# Patient Record
Sex: MALE | Race: WHITE | NOT HISPANIC OR LATINO | Employment: OTHER | URBAN - METROPOLITAN AREA
[De-identification: names, ages, dates, MRNs, and addresses within clinical notes are randomized per-mention and may not be internally consistent; named-entity substitution may affect disease eponyms.]

---

## 2018-07-23 ENCOUNTER — HOSPITAL ENCOUNTER (EMERGENCY)
Facility: HOSPITAL | Age: 58
Discharge: HOME | End: 2018-07-23
Attending: EMERGENCY MEDICINE | Admitting: EMERGENCY MEDICINE

## 2018-07-23 VITALS
DIASTOLIC BLOOD PRESSURE: 78 MMHG | RESPIRATION RATE: 20 BRPM | WEIGHT: 197 LBS | HEIGHT: 70 IN | SYSTOLIC BLOOD PRESSURE: 125 MMHG | TEMPERATURE: 98.5 F | OXYGEN SATURATION: 97 % | HEART RATE: 92 BPM | BODY MASS INDEX: 28.2 KG/M2

## 2018-07-23 DIAGNOSIS — R07.89 ATYPICAL CHEST PAIN: ICD-10-CM

## 2018-07-23 DIAGNOSIS — T78.40XA ALLERGIC REACTION TO DRUG, INITIAL ENCOUNTER: Primary | ICD-10-CM

## 2018-07-23 LAB
HOLD SPECIMEN: NORMAL
HOLD SPECIMEN: NORMAL
TROPONIN T SERPL-MCNC: <0.01 NG/ML (ref 0–0.03)
WHOLE BLOOD HOLD SPECIMEN: NORMAL
WHOLE BLOOD HOLD SPECIMEN: NORMAL

## 2018-07-23 PROCEDURE — 84484 ASSAY OF TROPONIN QUANT: CPT | Performed by: PHYSICIAN ASSISTANT

## 2018-07-23 PROCEDURE — 25010000002 ONDANSETRON PER 1 MG: Performed by: PHYSICIAN ASSISTANT

## 2018-07-23 PROCEDURE — 96375 TX/PRO/DX INJ NEW DRUG ADDON: CPT

## 2018-07-23 PROCEDURE — 93005 ELECTROCARDIOGRAM TRACING: CPT | Performed by: EMERGENCY MEDICINE

## 2018-07-23 PROCEDURE — 99284 EMERGENCY DEPT VISIT MOD MDM: CPT

## 2018-07-23 PROCEDURE — 96374 THER/PROPH/DIAG INJ IV PUSH: CPT

## 2018-07-23 PROCEDURE — 25010000002 METHYLPREDNISOLONE PER 125 MG: Performed by: PHYSICIAN ASSISTANT

## 2018-07-23 PROCEDURE — 93010 ELECTROCARDIOGRAM REPORT: CPT | Performed by: INTERNAL MEDICINE

## 2018-07-23 PROCEDURE — 99284 EMERGENCY DEPT VISIT MOD MDM: CPT | Performed by: PHYSICIAN ASSISTANT

## 2018-07-23 PROCEDURE — 25010000002 DIPHENHYDRAMINE PER 50 MG: Performed by: PHYSICIAN ASSISTANT

## 2018-07-23 RX ORDER — FAMOTIDINE 20 MG/1
20 TABLET, FILM COATED ORAL NIGHTLY PRN
Qty: 30 TABLET | Refills: 0 | Status: SHIPPED | OUTPATIENT
Start: 2018-07-23 | End: 2020-07-21

## 2018-07-23 RX ORDER — METHYLPREDNISOLONE SODIUM SUCCINATE 125 MG/2ML
125 INJECTION, POWDER, LYOPHILIZED, FOR SOLUTION INTRAMUSCULAR; INTRAVENOUS ONCE
Status: COMPLETED | OUTPATIENT
Start: 2018-07-23 | End: 2018-07-23

## 2018-07-23 RX ORDER — DIPHENHYDRAMINE HYDROCHLORIDE 50 MG/ML
25 INJECTION INTRAMUSCULAR; INTRAVENOUS ONCE
Status: COMPLETED | OUTPATIENT
Start: 2018-07-23 | End: 2018-07-23

## 2018-07-23 RX ORDER — EPINEPHRINE 0.3 MG/.3ML
0.3 INJECTION SUBCUTANEOUS ONCE
Qty: 1 EACH | Refills: 0 | Status: SHIPPED | OUTPATIENT
Start: 2018-07-23 | End: 2018-07-23

## 2018-07-23 RX ORDER — ONDANSETRON 4 MG/1
4 TABLET, ORALLY DISINTEGRATING ORAL 4 TIMES DAILY PRN
Qty: 12 TABLET | Refills: 0 | Status: SHIPPED | OUTPATIENT
Start: 2018-07-23 | End: 2020-07-21

## 2018-07-23 RX ORDER — SODIUM CHLORIDE 0.9 % (FLUSH) 0.9 %
10 SYRINGE (ML) INJECTION AS NEEDED
Status: DISCONTINUED | OUTPATIENT
Start: 2018-07-23 | End: 2018-07-23 | Stop reason: HOSPADM

## 2018-07-23 RX ORDER — METHYLPREDNISOLONE 4 MG/1
TABLET ORAL
Qty: 21 TABLET | Refills: 0 | Status: SHIPPED | OUTPATIENT
Start: 2018-07-23 | End: 2020-07-21

## 2018-07-23 RX ORDER — ONDANSETRON 2 MG/ML
4 INJECTION INTRAMUSCULAR; INTRAVENOUS ONCE
Status: COMPLETED | OUTPATIENT
Start: 2018-07-23 | End: 2018-07-23

## 2018-07-23 RX ADMIN — DIPHENHYDRAMINE HYDROCHLORIDE 25 MG: 50 INJECTION, SOLUTION INTRAMUSCULAR; INTRAVENOUS at 14:26

## 2018-07-23 RX ADMIN — METHYLPREDNISOLONE SODIUM SUCCINATE 125 MG: 125 INJECTION, POWDER, FOR SOLUTION INTRAMUSCULAR; INTRAVENOUS at 14:25

## 2018-07-23 RX ADMIN — FAMOTIDINE 20 MG: 10 INJECTION, SOLUTION INTRAVENOUS at 14:25

## 2018-07-23 RX ADMIN — ONDANSETRON 4 MG: 2 SOLUTION INTRAMUSCULAR; INTRAVENOUS at 15:02

## 2020-07-21 ENCOUNTER — OFFICE VISIT (OUTPATIENT)
Dept: FAMILY MEDICINE CLINIC | Facility: CLINIC | Age: 60
End: 2020-07-21

## 2020-07-21 VITALS
HEIGHT: 70 IN | HEART RATE: 69 BPM | RESPIRATION RATE: 14 BRPM | BODY MASS INDEX: 30.78 KG/M2 | OXYGEN SATURATION: 98 % | WEIGHT: 215 LBS | DIASTOLIC BLOOD PRESSURE: 80 MMHG | TEMPERATURE: 97.5 F | SYSTOLIC BLOOD PRESSURE: 122 MMHG

## 2020-07-21 DIAGNOSIS — Z00.00 ENCOUNTER FOR WELL ADULT EXAM WITHOUT ABNORMAL FINDINGS: ICD-10-CM

## 2020-07-21 DIAGNOSIS — M79.7 FIBROMYALGIA: ICD-10-CM

## 2020-07-21 DIAGNOSIS — R23.3 EASY BRUISING: ICD-10-CM

## 2020-07-21 DIAGNOSIS — E55.9 VITAMIN D DEFICIENCY: ICD-10-CM

## 2020-07-21 DIAGNOSIS — R25.2 MUSCLE CRAMPING: ICD-10-CM

## 2020-07-21 DIAGNOSIS — Z12.5 SCREENING PSA (PROSTATE SPECIFIC ANTIGEN): ICD-10-CM

## 2020-07-21 DIAGNOSIS — Z79.899 HIGH RISK MEDICATION USE: ICD-10-CM

## 2020-07-21 DIAGNOSIS — Z12.11 SCREEN FOR COLON CANCER: Primary | ICD-10-CM

## 2020-07-21 DIAGNOSIS — F43.10 PTSD (POST-TRAUMATIC STRESS DISORDER): ICD-10-CM

## 2020-07-21 DIAGNOSIS — Z11.59 ENCOUNTER FOR HEPATITIS C SCREENING TEST FOR LOW RISK PATIENT: ICD-10-CM

## 2020-07-21 PROCEDURE — 99204 OFFICE O/P NEW MOD 45 MIN: CPT | Performed by: FAMILY MEDICINE

## 2020-07-21 NOTE — PATIENT INSTRUCTIONS
Call [facility] today to schedule an appointment ASAP.    [facility]  [address], [place] [address]    The phone number is [phone]   Walk-in hours are as follows:    Monday: 8:30 am - 2:30 pm  Tuesday:8:30 am - 2:30 pm  Wednesday:8:30 am - 2:30 pm  Thursday:8:30 am - 2:30 pm      Exercising to Lose Weight  Exercise is structured, repetitive physical activity to improve fitness and health. Getting regular exercise is important for everyone. It is especially important if you are overweight. Being overweight increases your risk of heart disease, stroke, diabetes, high blood pressure, and several types of cancer. Reducing your calorie intake and exercising can help you lose weight.  Exercise is usually categorized as moderate or vigorous intensity. To lose weight, most people need to do a certain amount of moderate-intensity or vigorous-intensity exercise each week.  Moderate-intensity exercise    Moderate-intensity exercise is any activity that gets you moving enough to burn at least three times more energy (calories) than if you were sitting.  Examples of moderate exercise include:  · Walking a mile in 15 minutes.  · Doing light yard work.  · Biking at an easy pace.  Most people should get at least 150 minutes (2 hours and 30 minutes) a week of moderate-intensity exercise to maintain their body weight.  Vigorous-intensity exercise  Vigorous-intensity exercise is any activity that gets you moving enough to burn at least six times more calories than if you were sitting. When you exercise at this intensity, you should be working hard enough that you are not able to carry on a conversation.  Examples of vigorous exercise include:  · Running.  · Playing a team sport, such as football, basketball, and soccer.  · Jumping rope.  Most people should get at least 75 minutes (1 hour and 15 minutes) a week of vigorous-intensity exercise to maintain their body weight.  How can exercise affect me?  When you  exercise enough to burn more calories than you eat, you lose weight. Exercise also reduces body fat and builds muscle. The more muscle you have, the more calories you burn. Exercise also:  · Improves mood.  · Reduces stress and tension.  · Improves your overall fitness, flexibility, and endurance.  · Increases bone strength.  The amount of exercise you need to lose weight depends on:  · Your age.  · The type of exercise.  · Any health conditions you have.  · Your overall physical ability.  Talk to your health care provider about how much exercise you need and what types of activities are safe for you.  What actions can I take to lose weight?  Nutrition    · Make changes to your diet as told by your health care provider or diet and nutrition specialist (dietitian). This may include:  ? Eating fewer calories.  ? Eating more protein.  ? Eating less unhealthy fats.  ? Eating a diet that includes fresh fruits and vegetables, whole grains, low-fat dairy products, and lean protein.  ? Avoiding foods with added fat, salt, and sugar.  · Drink plenty of water while you exercise to prevent dehydration or heat stroke.  Activity  · Choose an activity that you enjoy and set realistic goals. Your health care provider can help you make an exercise plan that works for you.  · Exercise at a moderate or vigorous intensity most days of the week.  ? The intensity of exercise may vary from person to person. You can tell how intense a workout is for you by paying attention to your breathing and heartbeat. Most people will notice their breathing and heartbeat get faster with more intense exercise.  · Do resistance training twice each week, such as:  ? Push-ups.  ? Sit-ups.  ? Lifting weights.  ? Using resistance bands.  · Getting short amounts of exercise can be just as helpful as long structured periods of exercise. If you have trouble finding time to exercise, try to include exercise in your daily routine.  ? Get up, stretch, and walk  around every 30 minutes throughout the day.  ? Go for a walk during your lunch break.  ? Park your car farther away from your destination.  ? If you take public transportation, get off one stop early and walk the rest of the way.  ? Make phone calls while standing up and walking around.  ? Take the stairs instead of elevators or escalators.  · Wear comfortable clothes and shoes with good support.  · Do not exercise so much that you hurt yourself, feel dizzy, or get very short of breath.  Where to find more information  · U.S. Department of Health and Human Services: www.hhs.gov  · Centers for Disease Control and Prevention (CDC): www.cdc.gov  Contact a health care provider:  · Before starting a new exercise program.  · If you have questions or concerns about your weight.  · If you have a medical problem that keeps you from exercising.  Get help right away if you have any of the following while exercising:  · Injury.  · Dizziness.  · Difficulty breathing or shortness of breath that does not go away when you stop exercising.  · Chest pain.  · Rapid heartbeat.  Summary  · Being overweight increases your risk of heart disease, stroke, diabetes, high blood pressure, and several types of cancer.  · Losing weight happens when you burn more calories than you eat.  · Reducing the amount of calories you eat in addition to getting regular moderate or vigorous exercise each week helps you lose weight.  This information is not intended to replace advice given to you by your health care provider. Make sure you discuss any questions you have with your health care provider.  Document Released: 01/20/2012 Document Revised: 12/31/2018 Document Reviewed: 12/31/2018  Elsevier Patient Education © 2020 [employer]

## 2020-07-21 NOTE — ASSESSMENT & PLAN NOTE
Awaiting appointment with  neurology.   No pertinent past medical history <<----- Click to add NO pertinent Past Medical History
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
## 2020-07-22 PROBLEM — R74.8 ELEVATED LIVER ENZYMES: Status: ACTIVE | Noted: 2020-07-22

## 2020-07-22 PROBLEM — R79.89 ELEVATED SERUM CREATININE: Status: ACTIVE | Noted: 2020-07-22

## 2020-07-22 LAB
25(OH)D3+25(OH)D2 SERPL-MCNC: 12 NG/ML (ref 30–100)
ALBUMIN SERPL-MCNC: 4.4 G/DL (ref 3.8–4.9)
ALBUMIN/GLOB SERPL: 1.6 {RATIO} (ref 1.2–2.2)
ALP SERPL-CCNC: 81 IU/L (ref 39–117)
ALT SERPL-CCNC: 89 IU/L (ref 0–44)
AST SERPL-CCNC: 41 IU/L (ref 0–40)
BASOPHILS # BLD AUTO: 0.1 X10E3/UL (ref 0–0.2)
BASOPHILS NFR BLD AUTO: 1 %
BILIRUB SERPL-MCNC: 1.6 MG/DL (ref 0–1.2)
BUN SERPL-MCNC: 14 MG/DL (ref 6–24)
BUN/CREAT SERPL: 11 (ref 9–20)
CALCIUM SERPL-MCNC: 9 MG/DL (ref 8.7–10.2)
CHLORIDE SERPL-SCNC: 102 MMOL/L (ref 96–106)
CHOLEST SERPL-MCNC: 188 MG/DL (ref 100–199)
CHOLEST/HDLC SERPL: 4.3 RATIO (ref 0–5)
CO2 SERPL-SCNC: 26 MMOL/L (ref 20–29)
CREAT SERPL-MCNC: 1.28 MG/DL (ref 0.76–1.27)
EOSINOPHIL # BLD AUTO: 0.1 X10E3/UL (ref 0–0.4)
EOSINOPHIL NFR BLD AUTO: 1 %
ERYTHROCYTE [DISTWIDTH] IN BLOOD BY AUTOMATED COUNT: 12.9 % (ref 11.6–15.4)
GLOBULIN SER CALC-MCNC: 2.7 G/DL (ref 1.5–4.5)
GLUCOSE SERPL-MCNC: 101 MG/DL (ref 65–99)
GLUCOSE UR QL: NORMAL
HCT VFR BLD AUTO: 46 % (ref 37.5–51)
HCV AB S/CO SERPL IA: <0.1 S/CO RATIO (ref 0–0.9)
HDLC SERPL-MCNC: 44 MG/DL
HGB BLD-MCNC: 15.5 G/DL (ref 13–17.7)
IMM GRANULOCYTES # BLD AUTO: 0.1 X10E3/UL (ref 0–0.1)
IMM GRANULOCYTES NFR BLD AUTO: 1 %
KETONES UR QL STRIP: NORMAL
LDLC SERPL CALC-MCNC: 116 MG/DL (ref 0–99)
LYMPHOCYTES # BLD AUTO: 1.7 X10E3/UL (ref 0.7–3.1)
LYMPHOCYTES NFR BLD AUTO: 25 %
MCH RBC QN AUTO: 31.4 PG (ref 26.6–33)
MCHC RBC AUTO-ENTMCNC: 33.7 G/DL (ref 31.5–35.7)
MCV RBC AUTO: 93 FL (ref 79–97)
MONOCYTES # BLD AUTO: 0.6 X10E3/UL (ref 0.1–0.9)
MONOCYTES NFR BLD AUTO: 9 %
NEUTROPHILS # BLD AUTO: 4.3 X10E3/UL (ref 1.4–7)
NEUTROPHILS NFR BLD AUTO: 63 %
PH UR STRIP: NORMAL [PH]
PLATELET # BLD AUTO: 241 X10E3/UL (ref 150–450)
POTASSIUM SERPL-SCNC: 4.8 MMOL/L (ref 3.5–5.2)
PROT SERPL-MCNC: 7.1 G/DL (ref 6–8.5)
PROT UR QL STRIP: NORMAL
PSA SERPL-MCNC: 3.4 NG/ML (ref 0–4)
RBC # BLD AUTO: 4.93 X10E6/UL (ref 4.14–5.8)
REQUEST PROBLEM: NORMAL
SODIUM SERPL-SCNC: 141 MMOL/L (ref 134–144)
SP GR UR: NORMAL
TRIGL SERPL-MCNC: 142 MG/DL (ref 0–149)
TSH SERPL DL<=0.005 MIU/L-ACNC: 3.88 UIU/ML (ref 0.45–4.5)
VLDLC SERPL CALC-MCNC: 28 MG/DL (ref 5–40)
WBC # BLD AUTO: 6.8 X10E3/UL (ref 3.4–10.8)

## 2020-07-22 NOTE — PROGRESS NOTES
Please call the patient regarding his result(s).  Please let him know that his liver enzymes are elevated, his kidney function is diminished, and his vitamin D level is low.  Please schedule him an appointment within the next 1 to 2 weeks for further evaluation and management of these 3 issues.

## 2020-08-10 ENCOUNTER — OFFICE VISIT (OUTPATIENT)
Dept: FAMILY MEDICINE CLINIC | Facility: CLINIC | Age: 60
End: 2020-08-10

## 2020-08-10 VITALS
HEART RATE: 100 BPM | OXYGEN SATURATION: 98 % | HEIGHT: 70 IN | SYSTOLIC BLOOD PRESSURE: 136 MMHG | BODY MASS INDEX: 30.78 KG/M2 | DIASTOLIC BLOOD PRESSURE: 76 MMHG | WEIGHT: 215 LBS | TEMPERATURE: 98.8 F

## 2020-08-10 DIAGNOSIS — R79.89 ELEVATED SERUM CREATININE: ICD-10-CM

## 2020-08-10 DIAGNOSIS — E66.09 CLASS 1 OBESITY DUE TO EXCESS CALORIES WITH SERIOUS COMORBIDITY AND BODY MASS INDEX (BMI) OF 30.0 TO 30.9 IN ADULT: Primary | ICD-10-CM

## 2020-08-10 DIAGNOSIS — E55.9 VITAMIN D DEFICIENCY: ICD-10-CM

## 2020-08-10 DIAGNOSIS — E80.4 GILBERT'S DISEASE: ICD-10-CM

## 2020-08-10 DIAGNOSIS — R74.8 ELEVATED LIVER ENZYMES: ICD-10-CM

## 2020-08-10 PROBLEM — E66.811 CLASS 1 OBESITY DUE TO EXCESS CALORIES WITH SERIOUS COMORBIDITY AND BODY MASS INDEX (BMI) OF 30.0 TO 30.9 IN ADULT: Status: ACTIVE | Noted: 2020-08-10

## 2020-08-10 PROCEDURE — G0439 PPPS, SUBSEQ VISIT: HCPCS | Performed by: FAMILY MEDICINE

## 2020-08-10 PROCEDURE — 99213 OFFICE O/P EST LOW 20 MIN: CPT | Performed by: FAMILY MEDICINE

## 2020-08-10 RX ORDER — MULTIVIT-MIN/IRON/FOLIC ACID/K 18-600-40
2000 CAPSULE ORAL DAILY
Qty: 30 CAPSULE | Refills: 5 | Status: SHIPPED | OUTPATIENT
Start: 2020-08-10 | End: 2020-11-16 | Stop reason: SDUPTHER

## 2020-08-10 NOTE — PROGRESS NOTES
Subjective   Simón Jeter is a 60 y.o. male is here for   Chief Complaint   Patient presents with   • Annual Exam     follow up liver enzymes,kidney functions, low vitamin D       History of Present Illness     He is obese. He is working on increasing his exercise.  He is only been eating once a day.  He is going to start eating smaller more frequent meals.  He does not eat concentrated sweets.  He drinks cranberry juice, grape juice, and soda.  He is going to work on eliminating those from his diet and drink more water.    He states he has had Gilbert's disease and elevated liver enzymes since 1989.    His creatinine was elevated.    He has vitamin D deficiency.    The following portions of the patient's history were reviewed and updated as appropriate: allergies, current medications, past family history, past medical history, past social history, past surgical history and problem list.     reports that he has quit smoking. His smoking use included cigarettes. He has quit using smokeless tobacco. He reports that he drinks alcohol. He reports that he does not use drugs.    Review of Systems   Constitutional: Negative for activity change and unexpected weight change.   Respiratory: Negative for shortness of breath and wheezing.    Cardiovascular: Negative for chest pain and palpitations.   Gastrointestinal: Negative for abdominal pain, blood in stool and constipation.   Genitourinary: Negative for difficulty urinating and hematuria.   Musculoskeletal: Negative for gait problem.   Skin: Negative for color change and rash.        PHQ-9 Depression Screening  Little interest or pleasure in doing things?     Feeling down, depressed, or hopeless?     Trouble falling or staying asleep, or sleeping too much?     Feeling tired or having little energy?     Poor appetite or overeating?     Feeling bad about yourself - or that you are a failure or have let yourself or your family down?     Trouble concentrating on things, such  "as reading the newspaper or watching television?     Moving or speaking so slowly that other people could have noticed? Or the opposite - being so fidgety or restless that you have been moving around a lot more than usual?     Thoughts that you would be better off dead, or of hurting yourself in some way?     PHQ-9 Total Score     If you checked off any problems, how difficult have these problems made it for you to do your work, take care of things at home, or get along with other people?           Objective   /76 (BP Location: Right arm, Patient Position: Sitting, Cuff Size: Adult)   Pulse 100   Temp 98.8 °F (37.1 °C) (Tympanic)   Ht 177.8 cm (70\")   Wt 97.5 kg (215 lb)   SpO2 98%   BMI 30.85 kg/m²   Physical Exam   Constitutional: He is oriented to person, place, and time. He appears well-developed and well-nourished. No distress.   HENT:   Head: Normocephalic and atraumatic.   Right Ear: External ear normal.   Left Ear: External ear normal.   Nose: Nose normal.   Mouth/Throat: Oropharynx is clear and moist. No oropharyngeal exudate.   Eyes: Lids are normal. Right eye exhibits no discharge. Left eye exhibits no discharge. No scleral icterus.   Neck: Trachea normal, normal range of motion and full passive range of motion without pain. Neck supple. No tracheal deviation and no edema present. No thyromegaly present.   Cardiovascular: Normal rate, regular rhythm, normal heart sounds and intact distal pulses. Exam reveals no gallop and no friction rub.   No murmur heard.  Pulmonary/Chest: Effort normal and breath sounds normal. No stridor. No tachypnea and no bradypnea. No respiratory distress. He has no decreased breath sounds. He has no wheezes. He has no rales. He exhibits no tenderness.   Abdominal: Normal appearance. There is no hepatosplenomegaly.   Musculoskeletal: He exhibits no edema.   Lymphadenopathy:        Head (right side): No submental, no submandibular, no tonsillar, no preauricular, no " posterior auricular and no occipital adenopathy present.        Head (left side): No submental, no submandibular, no tonsillar, no preauricular, no posterior auricular and no occipital adenopathy present.     He has no cervical adenopathy.        Right cervical: No superficial cervical, no deep cervical and no posterior cervical adenopathy present.       Left cervical: No superficial cervical, no deep cervical and no posterior cervical adenopathy present.   Neurological: He is alert and oriented to person, place, and time. He has normal strength and normal reflexes. He is not disoriented.   Skin: Skin is warm, dry and intact. Capillary refill takes less than 2 seconds. No rash noted. He is not diaphoretic. No cyanosis or erythema. No pallor. Nails show no clubbing.   Psychiatric: He has a normal mood and affect. His behavior is normal. Cognition and memory are normal.   Nursing note and vitals reviewed.      Procedures    Assessment/Plan   Diagnoses and all orders for this visit:    1. Class 1 obesity due to excess calories with serious comorbidity and body mass index (BMI) of 30.0 to 30.9 in adult (Primary)  Assessment & Plan:  He is working on increasing his exercise.  He is only been eating once a day.  He is going to start eating smaller more frequent meals.  He does not eat concentrated sweets.  He drinks cranberry juice, grape juice, and soda.  He is going to work on eliminating those from his diet and drink more water.          2. Vitamin D deficiency  -     Vitamin D, Cholecalciferol, 50 MCG (2000 UT) capsule; Take 2,000 Units by mouth Daily.  Dispense: 30 capsule; Refill: 5    3. Gilbert's disease  Assessment & Plan:  Continue to monitor.  He is also to work on improving his diet and exercise and losing weight.          4. Elevated serum creatinine  Assessment & Plan:  He is going to work on improving his diet losing weight.      5. Elevated liver enzymes  Assessment & Plan:  Gilbert's disease.  Continue to  monitor.

## 2020-08-10 NOTE — PROGRESS NOTES
Subjective   Simón Jeter is a 60 y.o. male is here for annual physical exam.    Chief Complaint   Patient presents with   • Annual Exam     follow up liver enzymes,kidney functions, low vitamin D       History of Present Illness     The following portions of the patient's history were reviewed and updated as appropriate: allergies, current medications, past family history, past medical history, past social history, past surgical history and problem list.    Family History   Problem Relation Age of Onset   • Thyroid cancer Mother    • Diabetes Mother    • Cancer Mother    • Heart attack Father    • Heart disease Father        Social History     Socioeconomic History   • Marital status:      Spouse name: Not on file   • Number of children: Not on file   • Years of education: Not on file   • Highest education level: Not on file   Tobacco Use   • Smoking status: Former Smoker     Types: Cigarettes   • Smokeless tobacco: Former User   Substance and Sexual Activity   • Alcohol use: Yes     Comment: 1 beer a year   • Drug use: No   • Sexual activity: Defer         Current Outpatient Medications:   •  Vitamin D, Cholecalciferol, 50 MCG (2000 UT) capsule, Take 2,000 Units by mouth Daily., Disp: 30 capsule, Rfl: 5    Review of Systems   Constitutional: Negative for activity change, chills, fever and unexpected weight change.   HENT: Negative for congestion.    Eyes: Negative for visual disturbance.   Respiratory: Negative for shortness of breath.    Cardiovascular: Negative for chest pain and palpitations.   Gastrointestinal: Negative for abdominal pain and blood in stool.   Endocrine: Negative for cold intolerance and heat intolerance.   Genitourinary: Negative for hematuria.   Musculoskeletal: Negative for gait problem.   Skin: Negative for color change.   Allergic/Immunologic: Negative for immunocompromised state.   Neurological: Negative for weakness and light-headedness.   Hematological: Negative for adenopathy.    Psychiatric/Behavioral: Negative for sleep disturbance. The patient is not nervous/anxious.        PHQ-9 Depression Screening  Little interest or pleasure in doing things?     Feeling down, depressed, or hopeless?     Trouble falling or staying asleep, or sleeping too much?     Feeling tired or having little energy?     Poor appetite or overeating?     Feeling bad about yourself - or that you are a failure or have let yourself or your family down?     Trouble concentrating on things, such as reading the newspaper or watching television?     Moving or speaking so slowly that other people could have noticed? Or the opposite - being so fidgety or restless that you have been moving around a lot more than usual?     Thoughts that you would be better off dead, or of hurting yourself in some way?     PHQ-9 Total Score     If you checked off any problems, how difficult have these problems made it for you to do your work, take care of things at home, or get along with other people?         Objective   Vitals:    08/10/20 1440   BP: 136/76   Pulse: 100   Temp: 98.8 °F (37.1 °C)   SpO2: 98%     Physical Exam   Constitutional: He is oriented to person, place, and time. He appears well-developed and well-nourished. No distress.   HENT:   Head: Normocephalic and atraumatic.   Right Ear: External ear normal.   Left Ear: External ear normal.   Nose: Nose normal.   Mouth/Throat: Oropharynx is clear and moist. No oropharyngeal exudate.   Eyes: Lids are normal. Right eye exhibits no discharge. Left eye exhibits no discharge. No scleral icterus.   Neck: Trachea normal, normal range of motion and full passive range of motion without pain. Neck supple. No tracheal deviation and no edema present. No thyromegaly present.   Cardiovascular: Normal rate, regular rhythm, normal heart sounds and intact distal pulses. Exam reveals no gallop and no friction rub.   No murmur heard.  Pulmonary/Chest: Effort normal and breath sounds normal. No  stridor. No tachypnea and no bradypnea. No respiratory distress. He has no decreased breath sounds. He has no wheezes. He has no rales. He exhibits no tenderness.   Abdominal: Normal appearance. There is no hepatosplenomegaly.   Musculoskeletal: He exhibits no edema.   Lymphadenopathy:        Head (right side): No submental, no submandibular, no tonsillar, no preauricular, no posterior auricular and no occipital adenopathy present.        Head (left side): No submental, no submandibular, no tonsillar, no preauricular, no posterior auricular and no occipital adenopathy present.     He has no cervical adenopathy.        Right cervical: No superficial cervical, no deep cervical and no posterior cervical adenopathy present.       Left cervical: No superficial cervical, no deep cervical and no posterior cervical adenopathy present.   Neurological: He is alert and oriented to person, place, and time. He has normal strength and normal reflexes. He is not disoriented.   Skin: Skin is warm, dry and intact. Capillary refill takes less than 2 seconds. No rash noted. He is not diaphoretic. No cyanosis or erythema. No pallor. Nails show no clubbing.   Psychiatric: He has a normal mood and affect. His behavior is normal. Cognition and memory are normal.   Nursing note and vitals reviewed.      Procedures      Assessment/Plan   Diagnoses and all orders for this visit:    1. Class 1 obesity due to excess calories with serious comorbidity and body mass index (BMI) of 30.0 to 30.9 in adult (Primary)  Assessment & Plan:  He is working on increasing his exercise.  He is only been eating once a day.  He is going to start eating smaller more frequent meals.  He does not eat concentrated sweets.  He drinks cranberry juice, grape juice, and soda.  He is going to work on eliminating those from his diet and drink more water.          2. Vitamin D deficiency  -     Vitamin D, Cholecalciferol, 50 MCG (2000 UT) capsule; Take 2,000 Units by  mouth Daily.  Dispense: 30 capsule; Refill: 5  -     Vitamin D 25 hydroxy; Future    3. Gilbert's disease  Assessment & Plan:  Continue to monitor.  He is also to work on improving his diet and exercise and losing weight.        Orders:  -     Comprehensive Metabolic Panel; Future    4. Elevated serum creatinine  Assessment & Plan:  He is going to work on improving his diet losing weight.    Orders:  -     Comprehensive Metabolic Panel; Future    5. Elevated liver enzymes  Assessment & Plan:  Gilbert's disease.  Continue to monitor.    Orders:  -     Comprehensive Metabolic Panel; Future

## 2020-08-10 NOTE — ASSESSMENT & PLAN NOTE
He is working on increasing his exercise.  He is only been eating once a day.  He is going to start eating smaller more frequent meals.  He does not eat concentrated sweets.  He drinks cranberry juice, grape juice, and soda.  He is going to work on eliminating those from his diet and drink more water.

## 2020-08-10 NOTE — ASSESSMENT & PLAN NOTE
Continue to monitor.  He is also to work on improving his diet and exercise and losing weight.

## 2020-08-10 NOTE — PROGRESS NOTES
Subsequent Medicare Wellness Visit   The ABC's of the Annual Wellness Visit    Chief Complaint   Patient presents with   • Annual Exam     follow up liver enzymes,kidney functions, low vitamin D       HPI:  Simón Jeter YOB: 1960, is a 60 y.o. male who presents for a Subsequent Medicare Wellness Visit.    Recent Hospitalizations:  No hospitalization(s) within the last year..    Current Medical Providers:  Patient Care Team:  Michael Tang Jr., DO as PCP - General (Family Medicine)    Health Habits and Functional and Cognitive Screening and Depression Screening:  Functional & Cognitive Status 8/10/2020   Do you have difficulty preparing food and eating? No   Do you have difficulty bathing yourself, getting dressed or grooming yourself? No   Do you have difficulty using the toilet? No   Do you have difficulty moving around from place to place? No   Do you have trouble with steps or getting out of a bed or a chair? No   Current Diet Well Balanced Diet   Dental Exam Up to date   Eye Exam Up to date   Exercise (times per week) 3 times per week   Current Exercise Activities Include Cardiovasular Workout on Exercise Equipment   Do you need help using the phone?  No   Are you deaf or do you have serious difficulty hearing?  No   Do you need help with transportation? No   Do you need help shopping? No   Do you need help preparing meals?  No   Do you need help with housework?  No   Do you need help with laundry? No   Do you need help taking your medications? No   Do you need help managing money? No   Do you ever drive or ride in a car without wearing a seat belt? No   Have you felt unusual stress, anger or loneliness in the last month? No   Who do you live with? Spouse   If you need help, do you have trouble finding someone available to you? No   Have you been bothered in the last four weeks by sexual problems? No   Do you have difficulty concentrating, remembering or making decisions? No       Compared to one  year ago, the patient feels his physical health is the same and his mental health is the same.    Depression Screen:  PHQ-2/PHQ-9 Depression Screening 7/21/2020   Little interest or pleasure in doing things 0   Feeling down, depressed, or hopeless 0   Total Score 0         Past Medical/Family/Social History:  The following portions of the patient's history were reviewed and updated as appropriate: allergies, current medications, past family history, past medical history, past social history, past surgical history and problem list.    Allergies   Allergen Reactions   • Aspirin Swelling   • Iodine Unknown - High Severity   • Keflex [Cephalexin] Hives       No current outpatient medications on file.    Aspirin use counseling: Contraindicated from taking ASA    Current medication list contains no high risk medications.  No harmful drug interactions have been identified.     Family History   Problem Relation Age of Onset   • Thyroid cancer Mother    • Diabetes Mother    • Cancer Mother    • Heart attack Father    • Heart disease Father        Social History     Tobacco Use   • Smoking status: Former Smoker     Types: Cigarettes   • Smokeless tobacco: Former User   Substance Use Topics   • Alcohol use: Yes     Comment: 1 beer a year       Past Surgical History:   Procedure Laterality Date   • ESOPHAGUS SURGERY     • KNEE SURGERY Left    • SMALL INTESTINE SURGERY         Patient Active Problem List   Diagnosis   • Fibromyalgia   • PTSD (post-traumatic stress disorder)   • Easy bruising   • Muscle cramping   • Vitamin D deficiency   • Elevated liver enzymes   • Elevated serum creatinine       Review of Systems   Constitutional: Negative for activity change, chills, fever and unexpected weight change.   HENT: Negative for congestion.    Eyes: Negative for visual disturbance.   Respiratory: Negative for shortness of breath.    Cardiovascular: Negative for chest pain and palpitations.   Gastrointestinal: Negative for abdominal  "pain and blood in stool.   Endocrine: Negative for cold intolerance and heat intolerance.   Genitourinary: Negative for hematuria.   Musculoskeletal: Negative for gait problem.   Skin: Negative for color change.   Allergic/Immunologic: Negative for immunocompromised state.   Neurological: Negative for weakness and light-headedness.   Hematological: Negative for adenopathy.   Psychiatric/Behavioral: Negative for sleep disturbance. The patient is not nervous/anxious.        Objective     Vitals:    08/10/20 1440   BP: 136/76   BP Location: Right arm   Patient Position: Sitting   Cuff Size: Adult   Pulse: 100   Temp: 98.8 °F (37.1 °C)   TempSrc: Tympanic   SpO2: 98%   Weight: 97.5 kg (215 lb)   Height: 177.8 cm (70\")   PainSc:   6   PainLoc: Generalized       Patient's Body mass index is 30.85 kg/m². BMI is above normal parameters. Recommendations include: exercise counseling and nutrition counseling.      No exam data present    The patient has no evidence of cognitve impairment.     Physical Exam    Recent Lab Results:  Lab Results   Component Value Date     (H) 07/21/2020     Lab Results   Component Value Date    TRIG 142 07/21/2020    HDL 44 07/21/2020    VLDL 28 07/21/2020       Assessment/Plan   Age-appropriate Screening Schedule:  Refer to the list below for future screening recommendations based on patient's age, sex and/or medical conditions.      Health Maintenance   Topic Date Due   • COLONOSCOPY  07/21/2020   • INFLUENZA VACCINE  08/01/2020   • ZOSTER VACCINE (1 of 2) 08/10/2020 (Originally 7/10/2011)   • TDAP/TD VACCINES (2 - Td) 07/21/2025       Medicare Risks and Personalized Health Plan:  Obesity/Overweight       CMS-Preventive Services Quick Reference  Medicare Preventive Services Addressed:  Annual Wellness Visit (AWV)    Advance Care Planning:  ACP discussion was held with the patient during this visit. Patient has an advance directive (not in EMR), copy requested.    There are no diagnoses " linked to this encounter.    An After Visit Summary and PPPS with all of these plans were given to the patient.      Follow Up:  No follow-ups on file.

## 2020-08-15 ENCOUNTER — RESULTS ENCOUNTER (OUTPATIENT)
Dept: FAMILY MEDICINE CLINIC | Facility: CLINIC | Age: 60
End: 2020-08-15

## 2020-08-15 DIAGNOSIS — E55.9 VITAMIN D DEFICIENCY: ICD-10-CM

## 2020-11-08 ENCOUNTER — RESULTS ENCOUNTER (OUTPATIENT)
Dept: FAMILY MEDICINE CLINIC | Facility: CLINIC | Age: 60
End: 2020-11-08

## 2020-11-08 DIAGNOSIS — R74.8 ELEVATED LIVER ENZYMES: ICD-10-CM

## 2020-11-08 DIAGNOSIS — E80.4 GILBERT'S DISEASE: ICD-10-CM

## 2020-11-08 DIAGNOSIS — R79.89 ELEVATED SERUM CREATININE: ICD-10-CM

## 2020-11-16 ENCOUNTER — OFFICE VISIT (OUTPATIENT)
Dept: FAMILY MEDICINE CLINIC | Facility: CLINIC | Age: 60
End: 2020-11-16

## 2020-11-16 VITALS
OXYGEN SATURATION: 98 % | HEART RATE: 72 BPM | SYSTOLIC BLOOD PRESSURE: 120 MMHG | TEMPERATURE: 97.1 F | HEIGHT: 70 IN | DIASTOLIC BLOOD PRESSURE: 72 MMHG | WEIGHT: 215 LBS | BODY MASS INDEX: 30.78 KG/M2

## 2020-11-16 DIAGNOSIS — Z79.899 HIGH RISK MEDICATION USE: ICD-10-CM

## 2020-11-16 DIAGNOSIS — R79.89 ELEVATED SERUM CREATININE: Primary | ICD-10-CM

## 2020-11-16 DIAGNOSIS — N52.8 OTHER MALE ERECTILE DYSFUNCTION: ICD-10-CM

## 2020-11-16 DIAGNOSIS — E55.9 VITAMIN D DEFICIENCY: ICD-10-CM

## 2020-11-16 PROCEDURE — 99214 OFFICE O/P EST MOD 30 MIN: CPT | Performed by: FAMILY MEDICINE

## 2020-11-16 RX ORDER — SILDENAFIL 100 MG/1
100 TABLET, FILM COATED ORAL DAILY PRN
Qty: 20 TABLET | Refills: 1 | Status: SHIPPED | OUTPATIENT
Start: 2020-11-16 | End: 2023-03-27

## 2020-11-16 RX ORDER — MULTIVIT-MIN/IRON/FOLIC ACID/K 18-600-40
2000 CAPSULE ORAL DAILY
Qty: 30 CAPSULE | Refills: 5 | Status: SHIPPED | OUTPATIENT
Start: 2020-11-16 | End: 2021-05-17

## 2020-11-16 RX ORDER — LAMOTRIGINE 25 MG/1
TABLET ORAL
COMMUNITY
Start: 2020-10-27 | End: 2023-03-27

## 2020-11-16 NOTE — ASSESSMENT & PLAN NOTE
He has been exercising, improving his diet, and has lost 12 pounds.  Continue to work on diet, exercise and losing weight.

## 2020-11-16 NOTE — PROGRESS NOTES
Subjective   Simón Jeter is a 60 y.o. male is here for   Chief Complaint   Patient presents with   • Vitamin D Deficiency   • Elevated Hepatic Enzymes   • elevated creatinine       History of Present Illness     He has Gilbert's Disease.    He has elevated creatinine.    He is obese and has been losing weight.    He has vitamin D deficiency.    He states he has ED that responds well to Viagra. He does not have any side effects.  He got Rx from [facility] in [place] for $180.00 for 10 pills.    Health Maintenance Due   Topic Date Due   • COLONOSCOPY  1960        reports that he has quit smoking. His smoking use included cigarettes. He has quit using smokeless tobacco. He reports current alcohol use. He reports that he does not use drugs.    Review of Systems   Constitutional: Negative for activity change and unexpected weight change.   HENT: Negative for congestion.    Respiratory: Negative for shortness of breath and wheezing.    Cardiovascular: Negative for chest pain and palpitations.   Gastrointestinal: Negative for abdominal pain, blood in stool and constipation.   Genitourinary: Negative for difficulty urinating and hematuria.   Musculoskeletal: Negative for gait problem.   Skin: Negative for color change and rash.        PHQ-9 Depression Screening  Little interest or pleasure in doing things?     Feeling down, depressed, or hopeless?     Trouble falling or staying asleep, or sleeping too much?     Feeling tired or having little energy?     Poor appetite or overeating?     Feeling bad about yourself - or that you are a failure or have let yourself or your family down?     Trouble concentrating on things, such as reading the newspaper or watching television?     Moving or speaking so slowly that other people could have noticed? Or the opposite - being so fidgety or restless that you have been moving around a lot more than usual?     Thoughts that you would be better off dead, or of hurting yourself in  "some way?     PHQ-9 Total Score     If you checked off any problems, how difficult have these problems made it for you to do your work, take care of things at home, or get along with other people?       BP Readings from Last 12 Encounters:   11/16/20 120/72   08/10/20 136/76   07/21/20 122/80   07/23/18 125/78   08/16/16 100/56       Wt Readings from Last 12 Encounters:   11/16/20 97.5 kg (215 lb)   08/10/20 97.5 kg (215 lb)   07/21/20 97.5 kg (215 lb)   07/23/18 89.4 kg (197 lb)   08/16/16 94.3 kg (208 lb)        Objective   /72 (BP Location: Left arm, Patient Position: Sitting, Cuff Size: Adult)   Pulse 72   Temp 97.1 °F (36.2 °C) (Temporal)   Ht 177.8 cm (70\")   Wt 97.5 kg (215 lb)   SpO2 98%   BMI 30.85 kg/m²   Physical Exam  Vitals signs and nursing note reviewed.   Constitutional:       General: He is not in acute distress.     Appearance: Normal appearance. He is well-developed. He is not diaphoretic.   HENT:      Head: Normocephalic and atraumatic.      Right Ear: External ear normal.      Left Ear: External ear normal.      Nose: Nose normal.      Mouth/Throat:      Pharynx: No oropharyngeal exudate.   Eyes:      General: Lids are normal. No scleral icterus.        Right eye: No discharge.         Left eye: No discharge.   Neck:      Musculoskeletal: Full passive range of motion without pain, normal range of motion and neck supple. No edema.      Thyroid: No thyromegaly.      Trachea: Trachea normal. No tracheal deviation.   Cardiovascular:      Rate and Rhythm: Normal rate and regular rhythm.      Heart sounds: Normal heart sounds. No murmur. No friction rub. No gallop.    Pulmonary:      Effort: Pulmonary effort is normal. No tachypnea, bradypnea or respiratory distress.      Breath sounds: Normal breath sounds. No stridor. No decreased breath sounds, wheezing or rales.   Chest:      Chest wall: No tenderness.   Lymphadenopathy:      Head:      Right side of head: No submental, submandibular, " tonsillar, preauricular, posterior auricular or occipital adenopathy.      Left side of head: No submental, submandibular, tonsillar, preauricular, posterior auricular or occipital adenopathy.      Cervical: No cervical adenopathy.      Right cervical: No superficial, deep or posterior cervical adenopathy.     Left cervical: No superficial, deep or posterior cervical adenopathy.   Skin:     General: Skin is warm and dry.      Capillary Refill: Capillary refill takes less than 2 seconds.      Coloration: Skin is not pale.      Findings: No erythema or rash.      Nails: There is no clubbing.     Neurological:      Mental Status: He is alert and oriented to person, place, and time. He is not disoriented.      Deep Tendon Reflexes: Reflexes are normal and symmetric.   Psychiatric:         Behavior: Behavior normal.         Procedures    Assessment/Plan   Diagnoses and all orders for this visit:    1. Elevated serum creatinine (Primary)  Assessment & Plan:  He has been exercising, improving his diet, and has lost 12 pounds.  Continue to work on diet, exercise and losing weight.      2. Vitamin D deficiency  Assessment & Plan:  Resume vitamin D 2000 IU daily.    Orders:  -     Vitamin D, Cholecalciferol, 50 MCG (2000 UT) capsule; Take 2,000 Units by mouth Daily.  Dispense: 30 capsule; Refill: 5  -     Vitamin D 1,25 Dihydroxy; Future    3. Other male erectile dysfunction  -     sildenafil (Viagra) 100 MG tablet; Take 1 tablet by mouth Daily As Needed for Erectile Dysfunction.  Dispense: 20 tablet; Refill: 1    4. High risk medication use  -     CBC & Differential; Future  -     Comprehensive Metabolic Panel; Future           Return in about 6 months (around 5/16/2021) for Vtamin D Deficiency-U, elevated Creatinine-Stable, Obesity-Improving, Gilbert's Disease-S Labs prior.

## 2020-11-17 DIAGNOSIS — E55.9 VITAMIN D DEFICIENCY: Primary | ICD-10-CM

## 2020-11-17 LAB
25(OH)D3+25(OH)D2 SERPL-MCNC: 14.4 NG/ML (ref 30–100)
ALBUMIN SERPL-MCNC: 4.1 G/DL (ref 3.8–4.9)
ALBUMIN/GLOB SERPL: 1.5 {RATIO} (ref 1.2–2.2)
ALP SERPL-CCNC: 69 IU/L (ref 39–117)
ALT SERPL-CCNC: 45 IU/L (ref 0–44)
AST SERPL-CCNC: 24 IU/L (ref 0–40)
BILIRUB SERPL-MCNC: 1.1 MG/DL (ref 0–1.2)
BUN SERPL-MCNC: 10 MG/DL (ref 6–24)
BUN/CREAT SERPL: 8 (ref 9–20)
CALCIUM SERPL-MCNC: 9.2 MG/DL (ref 8.7–10.2)
CHLORIDE SERPL-SCNC: 104 MMOL/L (ref 96–106)
CO2 SERPL-SCNC: 22 MMOL/L (ref 20–29)
CREAT SERPL-MCNC: 1.22 MG/DL (ref 0.76–1.27)
GLOBULIN SER CALC-MCNC: 2.7 G/DL (ref 1.5–4.5)
GLUCOSE SERPL-MCNC: 98 MG/DL (ref 65–99)
POTASSIUM SERPL-SCNC: 3.8 MMOL/L (ref 3.5–5.2)
PROT SERPL-MCNC: 6.8 G/DL (ref 6–8.5)
SODIUM SERPL-SCNC: 139 MMOL/L (ref 134–144)

## 2020-11-17 RX ORDER — ERGOCALCIFEROL 1.25 MG/1
50000 CAPSULE ORAL WEEKLY
Qty: 5 CAPSULE | Refills: 1 | Status: SHIPPED | OUTPATIENT
Start: 2020-11-17 | End: 2021-01-26

## 2020-11-17 NOTE — PROGRESS NOTES
Please call the patient regarding his result(s).  Please let him know that his vitamin D level is still low.  I have called in a new and different prescription for vitamin D, 50,000 units to be taken once weekly, for the next 5 to 10 weeks.  Stop the other vitamin D supplement that you have been taking and go ahead and start this new one.

## 2021-01-24 DIAGNOSIS — E55.9 VITAMIN D DEFICIENCY: ICD-10-CM

## 2021-01-26 RX ORDER — ERGOCALCIFEROL 1.25 MG/1
CAPSULE ORAL
Qty: 5 CAPSULE | Refills: 0 | Status: SHIPPED | OUTPATIENT
Start: 2021-01-26 | End: 2021-05-17

## 2021-05-11 ENCOUNTER — LAB (OUTPATIENT)
Dept: LAB | Facility: HOSPITAL | Age: 61
End: 2021-05-11

## 2021-05-11 DIAGNOSIS — Z79.899 HIGH RISK MEDICATION USE: ICD-10-CM

## 2021-05-11 DIAGNOSIS — E55.9 VITAMIN D DEFICIENCY: ICD-10-CM

## 2021-05-11 PROCEDURE — 82652 VIT D 1 25-DIHYDROXY: CPT

## 2021-05-11 PROCEDURE — 36415 COLL VENOUS BLD VENIPUNCTURE: CPT

## 2021-05-11 PROCEDURE — 80053 COMPREHEN METABOLIC PANEL: CPT

## 2021-05-11 PROCEDURE — 85025 COMPLETE CBC W/AUTO DIFF WBC: CPT

## 2021-05-17 ENCOUNTER — OFFICE VISIT (OUTPATIENT)
Dept: FAMILY MEDICINE CLINIC | Facility: CLINIC | Age: 61
End: 2021-05-17

## 2021-05-17 VITALS
SYSTOLIC BLOOD PRESSURE: 132 MMHG | TEMPERATURE: 96.2 F | BODY MASS INDEX: 30.41 KG/M2 | OXYGEN SATURATION: 97 % | DIASTOLIC BLOOD PRESSURE: 74 MMHG | HEIGHT: 70 IN | WEIGHT: 212.4 LBS | HEART RATE: 81 BPM

## 2021-05-17 DIAGNOSIS — E55.9 VITAMIN D DEFICIENCY: ICD-10-CM

## 2021-05-17 DIAGNOSIS — Z12.11 ENCOUNTER FOR SCREENING COLONOSCOPY: Primary | ICD-10-CM

## 2021-05-17 PROCEDURE — 99213 OFFICE O/P EST LOW 20 MIN: CPT | Performed by: FAMILY MEDICINE

## 2021-05-17 NOTE — PROGRESS NOTES
Chief Complaint  Vitamin D Deficiency (Uncontrolled), Elevated Creatinine (Stable), Obesity (Improving), and Gilbert's Disease (S)    Subjective          Simón Jeter presents to [facility] for     History of Present Illness    Pt is obese. He has been losing weight, eating better and getting more exercise.    He has vitamin D deficiency and has been taking vitamin D.    Health Maintenance Due   Topic Date Due   • COLORECTAL CANCER SCREENING  Never done   • COVID-19 Vaccine (1) Never done        reports that he has quit smoking. His smoking use included cigarettes. He has quit using smokeless tobacco. He reports current alcohol use. He reports that he does not use drugs.    PHQ-9 Depression Screening  Little interest or pleasure in doing things? 0   Feeling down, depressed, or hopeless? 0   Trouble falling or staying asleep, or sleeping too much?     Feeling tired or having little energy?     Poor appetite or overeating?     Feeling bad about yourself - or that you are a failure or have let yourself or your family down?     Trouble concentrating on things, such as reading the newspaper or watching television?     Moving or speaking so slowly that other people could have noticed? Or the opposite - being so fidgety or restless that you have been moving around a lot more than usual?     Thoughts that you would be better off dead, or of hurting yourself in some way?     PHQ-9 Total Score 0   If you checked off any problems, how difficult have these problems made it for you to do your work, take care of things at home, or get along with other people?       Lab Results   Component Value Date    WBC 6.48 05/11/2021    HGB 15.7 05/11/2021    HCT 45.3 05/11/2021    MCV 89.2 05/11/2021     05/11/2021     Lab Results   Component Value Date    GLUCOSE 101 (H) 05/11/2021    BUN 13 05/11/2021    CREATININE 1.34 (H) 05/11/2021    EGFRIFNONA 55 (L) 05/11/2021    EGFRIFAFRI 75 11/16/2020    BCR  "9.7 05/11/2021    K 4.7 05/11/2021    CO2 27.0 05/11/2021    CALCIUM 9.3 05/11/2021    PROTENTOTREF 6.8 11/16/2020    ALBUMIN 4.30 05/11/2021    LABIL2 1.5 11/16/2020    AST 24 05/11/2021    ALT 48 (H) 05/11/2021     Lab Results   Component Value Date    TSH 3.880 07/21/2020     No results found for: HGBA1C  Brief Urine Lab Results  (Last result in the past 365 days)      Color   Clarity   Blood   Leuk Est   Nitrite   Protein   CREAT   Urine HCG        07/21/20 1428           CANCELED  Comment:  Test not performed    Result canceled by the ancillary.                 BP Readings from Last 12 Encounters:   05/17/21 132/74   11/16/20 120/72   08/10/20 136/76   07/21/20 122/80   07/23/18 125/78   08/16/16 100/56       Wt Readings from Last 12 Encounters:   05/17/21 96.3 kg (212 lb 6.4 oz)   11/16/20 97.5 kg (215 lb)   08/10/20 97.5 kg (215 lb)   07/21/20 97.5 kg (215 lb)   07/23/18 89.4 kg (197 lb)   08/16/16 94.3 kg (208 lb)       Objective   Vital Signs:   /74 (BP Location: Right arm, Patient Position: Sitting, Cuff Size: Adult)   Pulse 81   Temp 96.2 °F (35.7 °C) (Temporal)   Ht 177.8 cm (70\")   Wt 96.3 kg (212 lb 6.4 oz)   SpO2 97%   BMI 30.48 kg/m²     Physical Exam  Vitals and nursing note reviewed.   Constitutional:       General: He is not in acute distress.     Appearance: Normal appearance. He is well-developed. He is not diaphoretic.   HENT:      Head: Normocephalic and atraumatic.      Right Ear: External ear normal.      Left Ear: External ear normal.      Nose: Nose normal.      Mouth/Throat:      Pharynx: No oropharyngeal exudate.   Eyes:      General: Lids are normal. No scleral icterus.        Right eye: No discharge.         Left eye: No discharge.   Neck:      Thyroid: No thyromegaly.      Trachea: Trachea normal. No tracheal deviation.   Cardiovascular:      Rate and Rhythm: Normal rate and regular rhythm.      Heart sounds: Normal heart sounds. No murmur heard.   No friction rub. No " gallop.    Pulmonary:      Effort: Pulmonary effort is normal. No tachypnea, bradypnea or respiratory distress.      Breath sounds: Normal breath sounds. No stridor. No decreased breath sounds, wheezing or rales.   Chest:      Chest wall: No tenderness.   Musculoskeletal:      Cervical back: Full passive range of motion without pain, normal range of motion and neck supple. No edema.   Lymphadenopathy:      Head:      Right side of head: No submental, submandibular, tonsillar, preauricular, posterior auricular or occipital adenopathy.      Left side of head: No submental, submandibular, tonsillar, preauricular, posterior auricular or occipital adenopathy.      Cervical: No cervical adenopathy.      Right cervical: No superficial, deep or posterior cervical adenopathy.     Left cervical: No superficial, deep or posterior cervical adenopathy.   Skin:     General: Skin is warm and dry.      Capillary Refill: Capillary refill takes less than 2 seconds.      Coloration: Skin is not pale.      Findings: No erythema or rash.      Nails: There is no clubbing.   Neurological:      Mental Status: He is alert and oriented to person, place, and time. He is not disoriented.      Deep Tendon Reflexes: Reflexes are normal and symmetric.   Psychiatric:         Behavior: Behavior normal.        Result Review :     Common labs    Common Labsle 7/21/20 7/21/20 7/21/20 7/21/20 11/16/20 5/11/21 5/11/21    1428 1428 1428 1428  1253 1253   Glucose       101 (A)   Glucose 101 (A)    98     BUN 14    10  13   Creatinine 1.28 (A)    1.22  1.34 (A)   eGFR Non  Am 61    64  55 (A)   eGFR African Am 70    75     Sodium 141    139  139   Potassium 4.8    3.8  4.7   Chloride 102    104  102   Calcium 9.0    9.2  9.3   Total Protein 7.1    6.8     Albumin 4.4    4.1  4.30   Total Bilirubin 1.6 (A)    1.1  1.8 (A)   Alkaline Phosphatase 81    69  67   AST (SGOT) 41 (A)    24  24   ALT (SGPT) 89 (A)    45 (A)  48 (A)   WBC    6.8  6.48     Hemoglobin    15.5  15.7    Hematocrit    46.0  45.3    Platelets    241  248    Total Cholesterol  188        Triglycerides  142        HDL Cholesterol  44        LDL Cholesterol   116 (A)        PSA   3.4       (A) Abnormal value       Comments are available for some flowsheets but are not being displayed.                     Assessment and Plan    Problem List Items Addressed This Visit     Vitamin D deficiency    Overview     7/21/2020: He states he has had vitamin D deficiency for a long time is been taking vitamin D over-the-counter, which is in the center with vitamin D multivitamin he takes.  7/21/2020 Vitamin D level - 12 ()    11/16/2020:  Resume vitamin D 2000 IU daily.    May 17, 2021: Vitamin D level is now normal.  Hold vitamin D for now and we will recheck later in the future.         Current Assessment & Plan     Vitamin D level is now normal.  Hold vitamin D for now and we will recheck later in the future.           Other Visit Diagnoses     Encounter for screening colonoscopy    -  Primary    Relevant Orders    Ambulatory Referral to Gastroenterology          Follow Up   Return in about 3 months (around 8/17/2021) for Medicare Wellness.  Patient was given instructions and counseling regarding his condition or for health maintenance advice. Please see specific information pulled into the AVS if appropriate.

## 2021-06-09 ENCOUNTER — OFFICE VISIT (OUTPATIENT)
Dept: FAMILY MEDICINE CLINIC | Facility: CLINIC | Age: 61
End: 2021-06-09

## 2021-06-09 VITALS
HEIGHT: 70 IN | BODY MASS INDEX: 31.64 KG/M2 | SYSTOLIC BLOOD PRESSURE: 136 MMHG | DIASTOLIC BLOOD PRESSURE: 80 MMHG | WEIGHT: 221 LBS | HEART RATE: 78 BPM | TEMPERATURE: 97.2 F | OXYGEN SATURATION: 98 %

## 2021-06-09 DIAGNOSIS — L03.114 CELLULITIS OF LEFT UPPER EXTREMITY: Primary | ICD-10-CM

## 2021-06-09 PROCEDURE — 99214 OFFICE O/P EST MOD 30 MIN: CPT | Performed by: FAMILY MEDICINE

## 2021-06-09 RX ORDER — AMOXICILLIN AND CLAVULANATE POTASSIUM 875; 125 MG/1; MG/1
1 TABLET, FILM COATED ORAL 2 TIMES DAILY
Qty: 14 TABLET | Refills: 0 | Status: SHIPPED | OUTPATIENT
Start: 2021-06-09 | End: 2021-06-16

## 2021-06-09 NOTE — PROGRESS NOTES
Chief Complaint  Laceration    Subjective          Brentl VENU Jeter presents to [facility] for     History of Present Illness    Pt is here due to a laceration on his Left Forearm. Pt states he noticed it about a week ago. Pt stated there was drainage (green) and foul smell to the drainage.    -SHANKAR, RMA      Patient states that 2 days ago while he was at work he noticed an open area on his skin of the left forearm about midway between his wrist and his elbow.  He says there was a greenish-brownish liquid coming out.  He wiped it off.  He does not know what caused it.  The next morning he woke up and he had some more green or brown drainage coming from it.  He states he had a bad smell to it.  He states he had a rash and a fever when he took Keflex that he can take penicillin and has taken penicillin without any trouble since taking the Keflex.  He has been on Augmentin before without any trouble.    Health Maintenance Due   Topic Date Due   • COLORECTAL CANCER SCREENING  Never done   • COVID-19 Vaccine (1) Never done        reports that he has quit smoking. His smoking use included cigarettes. He has quit using smokeless tobacco. He reports current alcohol use. He reports that he does not use drugs.    PHQ-9 Depression Screening  Little interest or pleasure in doing things?     Feeling down, depressed, or hopeless?     Trouble falling or staying asleep, or sleeping too much?     Feeling tired or having little energy?     Poor appetite or overeating?     Feeling bad about yourself - or that you are a failure or have let yourself or your family down?     Trouble concentrating on things, such as reading the newspaper or watching television?     Moving or speaking so slowly that other people could have noticed? Or the opposite - being so fidgety or restless that you have been moving around a lot more than usual?     Thoughts that you would be better off dead, or of hurting yourself in some  "way?     PHQ-9 Total Score     If you checked off any problems, how difficult have these problems made it for you to do your work, take care of things at home, or get along with other people?       Lab Results   Component Value Date    WBC 6.48 05/11/2021    HGB 15.7 05/11/2021    HCT 45.3 05/11/2021    MCV 89.2 05/11/2021     05/11/2021     Lab Results   Component Value Date    GLUCOSE 101 (H) 05/11/2021    BUN 13 05/11/2021    CREATININE 1.34 (H) 05/11/2021    EGFRIFNONA 55 (L) 05/11/2021    EGFRIFAFRI 75 11/16/2020    BCR 9.7 05/11/2021    K 4.7 05/11/2021    CO2 27.0 05/11/2021    CALCIUM 9.3 05/11/2021    PROTENTOTREF 6.8 11/16/2020    ALBUMIN 4.30 05/11/2021    LABIL2 1.5 11/16/2020    AST 24 05/11/2021    ALT 48 (H) 05/11/2021     Lab Results   Component Value Date    TSH 3.880 07/21/2020     No results found for: HGBA1C  Brief Urine Lab Results  (Last result in the past 365 days)      Color   Clarity   Blood   Leuk Est   Nitrite   Protein   CREAT   Urine HCG        07/21/20 1428           CANCELED  Comment:  Test not performed    Result canceled by the ancillary.                 BP Readings from Last 12 Encounters:   06/09/21 136/80   05/17/21 132/74   11/16/20 120/72   08/10/20 136/76   07/21/20 122/80   07/23/18 125/78   08/16/16 100/56       Wt Readings from Last 12 Encounters:   06/09/21 100 kg (221 lb)   05/17/21 96.3 kg (212 lb 6.4 oz)   11/16/20 97.5 kg (215 lb)   08/10/20 97.5 kg (215 lb)   07/21/20 97.5 kg (215 lb)   07/23/18 89.4 kg (197 lb)   08/16/16 94.3 kg (208 lb)       Objective   Vital Signs:   /80 (BP Location: Left arm, Patient Position: Sitting, Cuff Size: Large Adult)   Pulse 78   Temp 97.2 °F (36.2 °C) (Temporal)   Ht 177.8 cm (70\")   Wt 100 kg (221 lb)   SpO2 98%   BMI 31.71 kg/m²     Physical Exam  Vitals and nursing note reviewed.   Constitutional:       General: He is not in acute distress.     Appearance: Normal appearance. He is well-developed. He is not " diaphoretic.   HENT:      Head: Normocephalic and atraumatic.      Right Ear: External ear normal.      Left Ear: External ear normal.      Nose: Nose normal.      Mouth/Throat:      Pharynx: No oropharyngeal exudate.   Eyes:      General: Lids are normal. No scleral icterus.        Right eye: No discharge.         Left eye: No discharge.   Neck:      Thyroid: No thyromegaly.      Trachea: Trachea normal. No tracheal deviation.   Cardiovascular:      Rate and Rhythm: Normal rate and regular rhythm.      Heart sounds: Normal heart sounds. No murmur heard.   No friction rub. No gallop.    Pulmonary:      Effort: Pulmonary effort is normal. No tachypnea, bradypnea or respiratory distress.      Breath sounds: Normal breath sounds. No stridor. No decreased breath sounds, wheezing or rales.   Chest:      Chest wall: No tenderness.   Musculoskeletal:      Cervical back: Full passive range of motion without pain, normal range of motion and neck supple. No edema.   Lymphadenopathy:      Head:      Right side of head: No submental, submandibular, tonsillar, preauricular, posterior auricular or occipital adenopathy.      Left side of head: No submental, submandibular, tonsillar, preauricular, posterior auricular or occipital adenopathy.      Cervical: No cervical adenopathy.      Right cervical: No superficial, deep or posterior cervical adenopathy.     Left cervical: No superficial, deep or posterior cervical adenopathy.   Skin:     General: Skin is warm and dry.      Capillary Refill: Capillary refill takes less than 2 seconds.      Coloration: Skin is not pale.      Findings: No erythema or rash.      Nails: There is no clubbing.      Comments: There is a 1 to 2 cm macular area of erythema on the left forearm midway between the wrist and the elbow on the dorsal surface.  There is a bruised area in the center with surrounding erythema.  It is tender to palpation.   Neurological:      Mental Status: He is alert and oriented  to person, place, and time. He is not disoriented.      Deep Tendon Reflexes: Reflexes are normal and symmetric.   Psychiatric:         Behavior: Behavior normal.        Result Review :                 Assessment and Plan    Problem List Items Addressed This Visit     None      Visit Diagnoses     Cellulitis of left upper extremity    -  Primary    Relevant Medications    amoxicillin-clavulanate (Augmentin) 875-125 MG per tablet          Follow Up   No follow-ups on file.  Patient was given instructions and counseling regarding his condition or for health maintenance advice. Please see specific information pulled into the AVS if appropriate.

## 2021-08-09 ENCOUNTER — TELEPHONE (OUTPATIENT)
Dept: FAMILY MEDICINE CLINIC | Facility: CLINIC | Age: 61
End: 2021-08-09

## 2021-08-09 NOTE — TELEPHONE ENCOUNTER
Caller: Simón Jeter    Relationship: Self    Best call back number: [phone]    What form or medical record are you requesting: LIST OF EVERY APPOINTMENT PATIENT HAS HAD WITH DR. WEINER     Who is requesting this form or medical record from you: SOCIAL  SECURITY OFFICE     How would you like to receive the form or medical records (pick-up, mail, fax):      Timeframe paperwork needed: ASAP PLEASE CALL WHEN READY TO

## 2021-08-17 ENCOUNTER — OFFICE VISIT (OUTPATIENT)
Dept: FAMILY MEDICINE CLINIC | Facility: CLINIC | Age: 61
End: 2021-08-17

## 2021-08-17 VITALS
TEMPERATURE: 96.9 F | HEIGHT: 70 IN | DIASTOLIC BLOOD PRESSURE: 70 MMHG | BODY MASS INDEX: 30.58 KG/M2 | SYSTOLIC BLOOD PRESSURE: 138 MMHG | WEIGHT: 213.6 LBS | OXYGEN SATURATION: 98 % | HEART RATE: 82 BPM

## 2021-08-17 DIAGNOSIS — Z12.11 ENCOUNTER FOR SCREENING COLONOSCOPY: ICD-10-CM

## 2021-08-17 DIAGNOSIS — E66.09 CLASS 1 OBESITY DUE TO EXCESS CALORIES WITH SERIOUS COMORBIDITY AND BODY MASS INDEX (BMI) OF 30.0 TO 30.9 IN ADULT: Primary | ICD-10-CM

## 2021-08-17 PROCEDURE — 1159F MED LIST DOCD IN RCRD: CPT | Performed by: FAMILY MEDICINE

## 2021-08-17 PROCEDURE — 1170F FXNL STATUS ASSESSED: CPT | Performed by: FAMILY MEDICINE

## 2021-08-17 PROCEDURE — G0439 PPPS, SUBSEQ VISIT: HCPCS | Performed by: FAMILY MEDICINE

## 2021-08-17 NOTE — PATIENT INSTRUCTIONS
Medicare Wellness  Personal Prevention Plan of Service     Date of Office Visit:  2021  Encounter Provider:  Michael Tang Jr., DO  Place of Service:  [facility]  Patient Name: Simón Jeter  :  1960    As part of the Medicare Wellness portion of your visit today, we are providing you with this personalized preventive plan of services (PPPS). This plan is based upon recommendations of the [place] Preventive Services Task Force (USPSTF) and the Advisory Committee on Immunization Practices (ACIP).    This lists the preventive care services that should be considered, and provides dates of when you are due. Items listed as completed are up-to-date and do not require any further intervention.    Health Maintenance   Topic Date Due   • COLORECTAL CANCER SCREENING  Never done   • COVID-19 Vaccine (1) Never done   • ZOSTER VACCINE (1 of 2) 2021 (Originally 7/10/2011)   • INFLUENZA VACCINE  10/01/2021   • ANNUAL WELLNESS VISIT  2022   • TDAP/TD VACCINES (2 - Td or Tdap) 2025   • HEPATITIS C SCREENING  Completed   • Pneumococcal Vaccine 0-64  Aged Out       Orders Placed This Encounter   Procedures   • Ambulatory Referral For Screening Colonoscopy     Referral Priority:   Routine     Referral Type:   Diagnostic Medical     Referral Reason:   Specialty Services Required     Referred to Provider:   Chema Ordoñez MD     Number of Visits Requested:   1       No follow-ups on file.

## 2021-08-17 NOTE — PROGRESS NOTES
The ABCs of the Annual Wellness Visit  Subsequent Medicare Wellness Visit    Chief Complaint   Patient presents with   • Medicare Wellness-subsequent       Subjective   History of Present Illness:  Simón Jeter is a 61 y.o. male who presents for a Subsequent Medicare Wellness Visit.    Pt states his legs cramp sometimes and he falls. He is following with a neurologist at  who he states addresses these issues with him.    He states his son attempted suicide a few weeks ago and is now getting help and is doing better. Patient states he does not need any counseling or psychiatry services. He states he is doing well.    HEALTH RISK ASSESSMENT    Recent Hospitalizations:  No hospitalization(s) within the last year.    Current Medical Providers:  Patient Care Team:  Michael Tang Jr.,  as PCP - General (Family Medicine)    Smoking Status:  Social History     Tobacco Use   Smoking Status Former Smoker   • Types: Cigarettes   Smokeless Tobacco Former User       Alcohol Consumption:  Social History     Substance and Sexual Activity   Alcohol Use Yes    Comment: 1 beer a year       Depression Screen:   PHQ-2/PHQ-9 Depression Screening 8/17/2021   Little interest or pleasure in doing things 0   Feeling down, depressed, or hopeless 0   Trouble falling or staying asleep, or sleeping too much 0   Feeling tired or having little energy 0   Poor appetite or overeating 0   Feeling bad about yourself - or that you are a failure or have let yourself or your family down 0   Trouble concentrating on things, such as reading the newspaper or watching television 0   Moving or speaking so slowly that other people could have noticed. Or the opposite - being so fidgety or restless that you have been moving around a lot more than usual 0   Thoughts that you would be better off dead, or of hurting yourself in some way 0   Total Score 0       Fall Risk Screen:  STEADI Fall Risk Assessment has not been completed.    Health Habits and  Functional and Cognitive Screening:  Functional & Cognitive Status 8/17/2021   Do you have difficulty preparing food and eating? No   Do you have difficulty bathing yourself, getting dressed or grooming yourself? No   Do you have difficulty using the toilet? No   Do you have difficulty moving around from place to place? No   Do you have trouble with steps or getting out of a bed or a chair? No   Current Diet Unhealthy Diet   Dental Exam Other        Dental Exam Comment Waiting on insurance to approve.    Eye Exam Not up to date   Exercise (times per week) 7 times per week   Current Exercises Include Walking   Current Exercise Activities Include -   Do you need help using the phone?  No   Are you deaf or do you have serious difficulty hearing?  No   Do you need help with transportation? No   Do you need help shopping? No   Do you need help preparing meals?  No   Do you need help with housework?  No   Do you need help with laundry? No   Do you need help taking your medications? No   Do you need help managing money? No   Do you ever drive or ride in a car without wearing a seat belt? No   Have you felt unusual stress, anger or loneliness in the last month? Yes   Who do you live with? Spouse   If you need help, do you have trouble finding someone available to you? No   Have you been bothered in the last four weeks by sexual problems? No   Do you have difficulty concentrating, remembering or making decisions? No         Does the patient have evidence of cognitive impairment? No    Asprin use counseling:Does not need ASA (and currently is not on it)    Age-appropriate Screening Schedule:  Refer to the list below for future screening recommendations based on patient's age, sex and/or medical conditions. Orders for these recommended tests are listed in the plan section. The patient has been provided with a written plan.    Health Maintenance   Topic Date Due   • ZOSTER VACCINE (1 of 2) 11/25/2021 (Originally 7/10/2011)   •  "INFLUENZA VACCINE  10/01/2021   • TDAP/TD VACCINES (2 - Td or Tdap) 07/21/2025          The following portions of the patient's history were reviewed and updated as appropriate: allergies, current medications, past family history, past medical history, past social history, past surgical history and problem list.    Outpatient Medications Prior to Visit   Medication Sig Dispense Refill   • lamoTRIgine (LaMICtal) 25 MG tablet      • sildenafil (Viagra) 100 MG tablet Take 1 tablet by mouth Daily As Needed for Erectile Dysfunction. 20 tablet 1     No facility-administered medications prior to visit.       Patient Active Problem List   Diagnosis   • Fibromyalgia   • PTSD (post-traumatic stress disorder)   • Easy bruising   • Muscle cramping   • Vitamin D deficiency   • Elevated liver enzymes   • Elevated serum creatinine   • Class 1 obesity due to excess calories with serious comorbidity and body mass index (BMI) of 30.0 to 30.9 in adult   • Gilbert's disease   • Other male erectile dysfunction       Advanced Care Planning:  ACP discussion was held with the patient during this visit. Patient has an advance directive (not in EMR), copy requested.    Review of Systems    Compared to one year ago, the patient feels his physical health is worse.  Compared to one year ago, the patient feels his mental health is the same.    Reviewed chart for potential of high risk medication in the elderly: yes  Reviewed chart for potential of harmful drug interactions in the elderly:yes    Objective         Vitals:    08/17/21 1249   BP: 138/70   BP Location: Left arm   Patient Position: Sitting   Cuff Size: Large Adult   Pulse: 82   Temp: 96.9 °F (36.1 °C)   TempSrc: Temporal   SpO2: 98%   Weight: 96.9 kg (213 lb 9.6 oz)   Height: 177.8 cm (70\")   PainSc: 0-No pain       Body mass index is 30.65 kg/m².  Discussed the patient's BMI with him. The BMI is above average; BMI management plan is completed.    Physical Exam      "     Assessment/Plan   Medicare Risks and Personalized Health Plan  CMS Preventative Services Quick Reference  Obesity/Overweight     The above risks/problems have been discussed with the patient.  Pertinent information has been shared with the patient in the After Visit Summary.  Follow up plans and orders are seen below in the Assessment/Plan Section.    Diagnoses and all orders for this visit:    1. Class 1 obesity due to excess calories with serious comorbidity and body mass index (BMI) of 30.0 to 30.9 in adult (Primary)  Assessment & Plan:  He is working on increasing his exercise. He is going to improve his diet.        2. Encounter for screening colonoscopy  -     Ambulatory Referral For Screening Colonoscopy    Follow Up:  Return in about 1 year (around 8/17/2022) for Medicare Wellness.     An After Visit Summary and PPPS were given to the patient.

## 2021-08-24 ENCOUNTER — NURSE TRIAGE (OUTPATIENT)
Dept: CALL CENTER | Facility: HOSPITAL | Age: 61
End: 2021-08-24

## 2021-08-24 NOTE — TELEPHONE ENCOUNTER
"Issa called the calling as febrile last evening 100.7, has been coughing non stop since last night with emesis, does not want ED will go to an . Chest pain is from his coughing    Reason for Disposition  • Patient sounds very sick or weak to the triager    Additional Information  • Negative: SEVERE difficulty breathing (e.g., struggling for each breath, speaks in single words)  • Negative: Bluish (or gray) lips or face now  • Negative: [1] Rapid onset of cough AND [2] has hives  • Negative: Coughing started suddenly after medicine, an allergic food or bee sting  • Negative: [1] Difficulty breathing AND [2] exposure to flames, smoke, or fumes  • Negative: [1] Stridor AND [2] difficulty breathing  • Negative: Sounds like a life-threatening emergency to the triager  • Negative: Choked on object of food that could be caught in the throat  • Negative: Chest pain is main symptom  • Negative: [1] Previous asthma attacks AND [2] this feels like asthma attack  • Negative: Cough lasts > 3 weeks  • Negative: Wet cough (productive; white-yellow, yellow, green, or jan colored sputum)  • Negative: [1] Dry cough (non-productive;  no sputum or minimal clear sputum) AND [2] within 14 days of COVID-19 Exposure  • Negative: [1] MODERATE difficulty breathing (e.g., speaks in phrases, SOB even at rest, pulse 100-120) AND [2] still present when not coughing  • Negative: Chest pain  (Exception: MILD central chest pain, present only when coughing)  • Negative: [1] MILD difficulty breathing (e.g., minimal/no SOB at rest, SOB with walking, pulse <100) AND [2] still present when not coughing    Answer Assessment - Initial Assessment Questions  1. ONSET: \"When did the cough begin?\"     Last night  2. SEVERITY: \"How bad is the cough today?\"    can not stop  3. SPUTUM: \"Describe the color of your sputum\" (none, dry cough; clear, white, yellow, green)      White   4. HEMOPTYSIS: \"Are you coughing up any blood?\" If so ask: \"How much?\" (nirav, " "streaks, tablespoons, etc.)      no  5. DIFFICULTY BREATHING: \"Are you having difficulty breathing?\" If Yes, ask: \"How bad is it?\" (e.g., mild, moderate, severe)     - MILD: No SOB at rest, mild SOB with walking, speaks normally in sentences, can lay down, no retractions, pulse < 100.     - MODERATE: SOB at rest, SOB with minimal exertion and prefers to sit, cannot lie down flat, speaks in phrases, mild retractions, audible wheezing, pulse 100-120.     - SEVERE: Very SOB at rest, speaks in single words, struggling to breathe, sitting hunched forward, retractions, pulse > 120       no  6. FEVER: \"Do you have a fever?\" If Yes, ask: \"What is your temperature, how was it measured, and when did it start?\"      100.7 last night  7. CARDIAC HISTORY: \"Do you have any history of heart disease?\" (e.g., heart attack, congestive heart failure)       no  8. LUNG HISTORY: \"Do you have any history of lung disease?\"  (e.g., pulmonary embolus, asthma, emphysema)      no  9. PE RISK FACTORS: \"Do you have a history of blood clots?\" (or: recent major surgery, recent prolonged travel, bedridden)      no  10. OTHER SYMPTOMS: \"Do you have any other symptoms?\" (e.g., runny nose, wheezing, chest pain)        Nausea with emesis of stomach acid  11. PREGNANCY: \"Is there any chance you are pregnant?\" \"When was your last menstrual period?\"        na  12. TRAVEL: \"Have you traveled out of the country in the last month?\" (e.g., travel history, exposures)        no    Protocols used: COUGH - ACUTE NON-PRODUCTIVE-ADULT-AH      "

## 2021-08-31 ENCOUNTER — HOSPITAL ENCOUNTER (OUTPATIENT)
Dept: INFUSION THERAPY | Facility: HOSPITAL | Age: 61
Discharge: HOME | End: 2021-08-31
Admitting: FAMILY MEDICINE

## 2021-08-31 ENCOUNTER — TRANSCRIBE ORDERS (OUTPATIENT)
Dept: ADMINISTRATIVE | Facility: HOSPITAL | Age: 61
End: 2021-08-31

## 2021-08-31 ENCOUNTER — TELEPHONE (OUTPATIENT)
Dept: FAMILY MEDICINE CLINIC | Facility: CLINIC | Age: 61
End: 2021-08-31

## 2021-08-31 VITALS
OXYGEN SATURATION: 94 % | HEART RATE: 65 BPM | RESPIRATION RATE: 22 BRPM | TEMPERATURE: 97.5 F | SYSTOLIC BLOOD PRESSURE: 111 MMHG | DIASTOLIC BLOOD PRESSURE: 60 MMHG

## 2021-08-31 DIAGNOSIS — U07.1 CLINICAL DIAGNOSIS OF COVID-19: Primary | ICD-10-CM

## 2021-08-31 DIAGNOSIS — U07.1 CLINICAL DIAGNOSIS OF SEVERE ACUTE RESPIRATORY SYNDROME CORONAVIRUS 2 (SARS-COV-2) DISEASE: Primary | ICD-10-CM

## 2021-08-31 PROCEDURE — 25010000006 INJECTION, CASIRIVIMAB AND IMDEVIMAB, 1200 MG: Performed by: FAMILY MEDICINE

## 2021-08-31 PROCEDURE — M0243 CASIRIVI AND IMDEVI INFUSION: HCPCS | Performed by: FAMILY MEDICINE

## 2021-08-31 PROCEDURE — 96365 THER/PROPH/DIAG IV INF INIT: CPT | Performed by: NURSE PRACTITIONER

## 2021-08-31 RX ORDER — EPINEPHRINE 1 MG/ML
0.3 INJECTION, SOLUTION, CONCENTRATE INTRAVENOUS AS NEEDED
Status: CANCELLED | OUTPATIENT
Start: 2021-08-31

## 2021-08-31 RX ORDER — SODIUM CHLORIDE 9 MG/ML
30 INJECTION, SOLUTION INTRAVENOUS ONCE
Status: CANCELLED | OUTPATIENT
Start: 2021-08-31

## 2021-08-31 RX ORDER — SODIUM CHLORIDE 9 MG/ML
30 INJECTION, SOLUTION INTRAVENOUS ONCE
Status: DISCONTINUED | OUTPATIENT
Start: 2021-08-31 | End: 2021-09-02 | Stop reason: HOSPADM

## 2021-08-31 RX ORDER — DIPHENHYDRAMINE HYDROCHLORIDE 50 MG/ML
50 INJECTION INTRAMUSCULAR; INTRAVENOUS AS NEEDED
Status: CANCELLED | OUTPATIENT
Start: 2021-08-31

## 2021-08-31 RX ORDER — METHYLPREDNISOLONE SODIUM SUCCINATE 125 MG/2ML
125 INJECTION, POWDER, LYOPHILIZED, FOR SOLUTION INTRAMUSCULAR; INTRAVENOUS AS NEEDED
Status: CANCELLED | OUTPATIENT
Start: 2021-08-31

## 2021-08-31 RX ORDER — METHYLPREDNISOLONE SODIUM SUCCINATE 125 MG/2ML
125 INJECTION, POWDER, LYOPHILIZED, FOR SOLUTION INTRAMUSCULAR; INTRAVENOUS AS NEEDED
Status: DISCONTINUED | OUTPATIENT
Start: 2021-08-31 | End: 2021-09-02 | Stop reason: HOSPADM

## 2021-08-31 RX ORDER — DIPHENHYDRAMINE HYDROCHLORIDE 50 MG/ML
50 INJECTION INTRAMUSCULAR; INTRAVENOUS AS NEEDED
Status: DISCONTINUED | OUTPATIENT
Start: 2021-08-31 | End: 2021-09-02 | Stop reason: HOSPADM

## 2021-08-31 RX ADMIN — CASIRIVIMAB AND IMDEVIMAB: 600; 600 INJECTION, SOLUTION, CONCENTRATE INTRAVENOUS at 16:13

## 2021-08-31 NOTE — TELEPHONE ENCOUNTER
Caller: Simón Jeter    Relationship: Self    Best call back number: [phone]    What orders are you requesting (i.e. lab or imaging): INFUSION DUE TO BEING COVID POSITIVE    In what timeframe would the patient need to come in: ASAP     Where will you receive your lab/imaging services: N/A    Additional notes: PATIENT IS CALLING IN REGARDS TO WANTING AN INFUSION DUE TO HIM HAVING COVID, HE TESTED POSITIVE ON 08/24 AND HAS COUGH, NO APPETITE (STATED THAT HE HAS NOT EATEN IN TEN DAYS) SLIGHT CHILLS AND HEADACHE. HE STATED THAT HIS WIFE HAD CALLED AND ASKED FOR ONE AND THE INDIVIDUAL ON THE LINE ALSO SPOKE TO HIM AND HE STATED HE WANTED ONE ALSO BUT, THERE ARE NO NOTES IN CHART IN REGARDS TO HIM REQUESTING IT.

## 2021-08-31 NOTE — TELEPHONE ENCOUNTER
Patient called and states he wishes to have the COVID-19 monoclonal antibody infusion.  Patient states that on August 22, 2021 he started feeling karate and developed a cough that would not stop.  He also had body aches.  His wife developed symptoms before he did and was recently diagnosed with Covid-19.  His wife tested positive for COVID-19 on August 22, 2021, the day he started feeling bad.  The patient had a COVID-19 test on August 24, 2021 that was positive.  He has also developed a headache and loss of appetite.  He has had some chills.  He did have a fever as well.  He has not had a fever for the past 3 to 4 days.  No loss of taste or smell.  He has a sore throat.  He has had some diarrhea.  No nausea or vomiting.  He states his heart rate went up to 172 twice.  He has not received any COVID-19 vaccines.  I reviewed the fact sheet for patients parents and caregivers emergency use authorization of cancer of Casirivimab/imdevimab for coronavirus disease 2019 with the patient.  I filled out the forms and I am faxing them to the hospital for him to receive the treatment.

## 2021-08-31 NOTE — PROGRESS NOTES
[facility]  Clinical Pharmacy Department     Pharmacy Consult/Review: COVID-19 Monoclonal Antibody    Simón Jeter is a 61 y.o. male presenting with mild to moderate COVID-19 symptoms and has tested positive for SARS-CoV-2.    COVID-19 Monoclonal Antibody Ordered (casirivimab/imdevimab): 8/31/21  Ordering/Consulting Provider: Dr. Michael Tang  Date of Confirmed SARS-CoV-2: 8/26/21  Date of Symptom Onset : 8/22/21    Allergies  Allergies as of 08/31/2021 - Reviewed 08/24/2021   Allergen Reaction Noted    Aspirin Swelling 08/16/2016    Iodine Unknown - High Severity 08/16/2016    Keflex [cephalexin] Hives 08/16/2016     Microbiology  Microbiology Results (last 10 days)       Procedure Component Value - Date/Time    COVID-19,[facility],NP/OP Swab in Transport Media or ESwab 72 HR TAT - Swab, Nasopharynx [[id number]]  (Abnormal) Collected: 08/24/21 1650    Lab Status: Final result Specimen: Swab from Nasopharynx Updated: 08/26/21 1908    Narrative:      The following orders were created for panel order COVID-19,[facility],NP/OP Swab in Transport Media or ESwab 72 HR TAT - Swab, Nasopharynx.  Procedure                               Abnormality         Status                     ---------                               -----------         ------                     COVID-19,[facility] ROUTINE...[[id number]]  Abnormal            Final result               COVID [facility] Priority -...[[id number]]                      Final result                 Please view results for these tests on the individual orders.    COVID-19,[facility] ROUTINE, NP/OP SWAB IN TRANSPORT MEDIA OR ESWAB 72 HR TAT - Swab, Nasopharynx [[id number]]  (Abnormal) Collected: 08/24/21 1650    Lab Status: Final result Specimen: Swab from Nasopharynx Updated: 08/26/21 1908     SARS-CoV-2, KENNEDI Detected     Comment: Patients who have a positive COVID-19 test result may now have  treatment options. Treatment options are available for patients  with mild to moderate symptoms  and for hospitalized patients.  Visit our website at [email or web address] for  resources and information.  This nucleic acid amplification test was developed and its performance  characteristics determined by [employer]. Nucleic acid  amplification tests include RT-PCR and TMA. This test has not been  FDA cleared or approved. This test has been authorized by FDA under  an Emergency Use Authorization (EUA). This test is only authorized  for the duration of time the declaration that circumstances exist  justifying the authorization of the emergency use of in vitro  diagnostic tests for detection of SARS-CoV-2 virus and/or diagnosis  of COVID-19 infection under section 564(b)(1) of the Act, 21 U.S.C.  360bbb-3(b) (1), unless the authorization is terminated or revoked  sooner.  When diagnostic testing is negative, the possibility of a false  negative result should be considered in the context of a patient's  recent exposures and the presence of clinical signs and symptoms  consistent with COVID-19. An individual without symptoms of COVID-19  and who is not shedding SARS-CoV-2 virus would expect to have a  negative (not detected) result in this assay.       Narrative:      Performed at:  [facility] - [facility]  [address], [facility], [place]  [id number]  : David Chauhan [facility], Phone:  [phone]    COVID [facility] Priority - Swab, Nasopharynx [[id number]] Collected: 08/24/21 1650    Lab Status: Final result Specimen: Swab from Nasopharynx Updated: 08/26/21 1908     COVID [facility] PRIORITY Comment     Comment: Received       Narrative:      Performed at:  [facility] - [facility]  [address], [facility], [place]  [id number]  : David Chauhan [facility], Phone:  [phone]    SARS-CoV-2, KENNEDI 2 DAY TAT - Swab, Nasopharynx [[id number]] Collected: 08/24/21 1650    Lab Status: Final result Specimen: Swab from Nasopharynx Updated: 08/26/21 1908     [facility] SARS[facility], KENN[facility]I 2 DAY TAT Performed    Narrative:       Performed at:  [address] [place] [address]  [id number]  : Anthony Haley PhD, Phone:  [phone]     Assessment/Plan:    Patient is not hospitalized due to COVID-19 infection and does not require oxygen therapy or an increase in baseline oxygen flow rate due to COVID-19.   All inclusions, exclusions, and monitoring requirements listed below have been reviewed.    Patient has tested positive for SARS-CoV-2.  Patient is within 10 days of symptom onset.  Patient is not hospitalized due to COVID-19 infection.  Patient is not requiring oxygen therapy or an increase in baseline oxygen flow rate.   Patient is at high risk for progressing to severe COVID-19 and/or hospitalization as defined by having met the following criteria: BMi >/= 25, CKD.  Patient has no known hypersensitivity to any ingredient of the monoclonal antibody.  Ordering provider has documented that they obtained verbal consent and discussion of FDA EUA Fact Sheet for Patients and Caregivers (physical copy will be provided at infusion site).    Thank you for involving pharmacy in this patient's care. Please contact pharmacy with any questions or concerns.                           Marilou Milner, Pharm.D., [facility]   Clinical Staff Pharmacist  Phone Extension #[phone]  08/31/21 14:29 EDT

## 2021-09-28 ENCOUNTER — TELEMEDICINE (OUTPATIENT)
Dept: FAMILY MEDICINE CLINIC | Facility: CLINIC | Age: 61
End: 2021-09-28

## 2021-09-28 DIAGNOSIS — R05.9 COUGH: Primary | ICD-10-CM

## 2021-09-28 PROCEDURE — 99213 OFFICE O/P EST LOW 20 MIN: CPT | Performed by: FAMILY MEDICINE

## 2021-09-28 RX ORDER — GUAIFENESIN 600 MG/1
1200 TABLET, EXTENDED RELEASE ORAL 2 TIMES DAILY
Qty: 30 TABLET | Refills: 1 | Status: SHIPPED | OUTPATIENT
Start: 2021-09-28

## 2021-09-28 NOTE — PROGRESS NOTES
Maria Teresa Jeter is a 61 y.o. male is here for No chief complaint on file.      You have chosen to receive care through a telehealth visit.  Do you consent to use a video/audio connection for your medical care today? Yes    History of Present Illness     Unable to complete visit using a video connection to the patient. A phone visit was used to complete this visits. Total time of discussion was 15 minutes.      Pt had COVID-19 diagnosed on 8-. He is still tired. He was not hospitalized. He had outpatient monoclonal antibody treatment. He did not have any side effects. He did not feel better the next day. He still has a cough. He gets SOA if he coughs a lot as well as light headed. The light-headedness and SOA only happens occassionally. He states his cough is improving. He states he is getting better.    Health Maintenance Due   Topic Date Due   • COLORECTAL CANCER SCREENING  Never done   • COVID-19 Vaccine (1) Never done       The following portions of the patient's history were reviewed and updated as appropriate: allergies, current medications, past family history, past medical history, past social history, past surgical history and problem list.     reports that he has quit smoking. His smoking use included cigarettes. He has quit using smokeless tobacco. He reports current alcohol use. He reports that he does not use drugs.    Review of Systems     PHQ-9 Depression Screening  Little interest or pleasure in doing things?     Feeling down, depressed, or hopeless?     Trouble falling or staying asleep, or sleeping too much?     Feeling tired or having little energy?     Poor appetite or overeating?     Feeling bad about yourself - or that you are a failure or have let yourself or your family down?     Trouble concentrating on things, such as reading the newspaper or watching television?     Moving or speaking so slowly that other people could have noticed? Or the opposite - being so fidgety or  restless that you have been moving around a lot more than usual?     Thoughts that you would be better off dead, or of hurting yourself in some way?     PHQ-9 Total Score     If you checked off any problems, how difficult have these problems made it for you to do your work, take care of things at home, or get along with other people?         BP Readings from Last 12 Encounters:   08/31/21 111/60   08/24/21 135/80   08/17/21 138/70   06/09/21 136/80   05/17/21 132/74   11/16/20 120/72   08/10/20 136/76   07/21/20 122/80   07/23/18 125/78   08/16/16 100/56       Wt Readings from Last 12 Encounters:   08/24/21 96.6 kg (213 lb)   08/17/21 96.9 kg (213 lb 9.6 oz)   06/09/21 100 kg (221 lb)   05/17/21 96.3 kg (212 lb 6.4 oz)   11/16/20 97.5 kg (215 lb)   08/10/20 97.5 kg (215 lb)   07/21/20 97.5 kg (215 lb)   07/23/18 89.4 kg (197 lb)   08/16/16 94.3 kg (208 lb)        Objective   There were no vitals taken for this visit.  Physical Exam   Neurological: He is alert.        Procedures    Assessment/Plan   Diagnoses and all orders for this visit:    1. Cough (Primary)  -     guaiFENesin (Mucinex) 600 MG 12 hr tablet; Take 2 tablets by mouth 2 (Two) Times a Day.  Dispense: 30 tablet; Refill: 1              No follow-ups on file.

## 2021-09-28 NOTE — PATIENT INSTRUCTIONS

## 2023-03-27 ENCOUNTER — OFFICE VISIT (OUTPATIENT)
Dept: FAMILY MEDICINE CLINIC | Facility: CLINIC | Age: 63
End: 2023-03-27
Payer: COMMERCIAL

## 2023-03-27 VITALS
DIASTOLIC BLOOD PRESSURE: 78 MMHG | TEMPERATURE: 97.9 F | HEART RATE: 84 BPM | SYSTOLIC BLOOD PRESSURE: 128 MMHG | OXYGEN SATURATION: 97 % | HEIGHT: 70 IN | RESPIRATION RATE: 16 BRPM | BODY MASS INDEX: 29.49 KG/M2 | WEIGHT: 206 LBS

## 2023-03-27 DIAGNOSIS — E55.9 VITAMIN D DEFICIENCY: ICD-10-CM

## 2023-03-27 DIAGNOSIS — Z12.11 SCREENING FOR COLON CANCER: ICD-10-CM

## 2023-03-27 DIAGNOSIS — E78.2 MIXED HYPERLIPIDEMIA: ICD-10-CM

## 2023-03-27 DIAGNOSIS — M62.838 MUSCLE SPASM: ICD-10-CM

## 2023-03-27 DIAGNOSIS — F43.10 PTSD (POST-TRAUMATIC STRESS DISORDER): ICD-10-CM
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
## 2023-03-27 RX ORDER — PRAZOSIN HYDROCHLORIDE 2 MG/1
2 CAPSULE ORAL NIGHTLY
Qty: 30 CAPSULE | Refills: 2 | Status: SHIPPED | OUTPATIENT
Start: 2023-03-27
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
## 2023-03-28 DIAGNOSIS — E55.9 VITAMIN D DEFICIENCY: Primary | ICD-10-CM

## 2023-03-28 LAB — 25(OH)D3+25(OH)D2 SERPL-MCNC: 9.5 NG/ML (ref 30–100)

## 2023-03-28 RX ORDER — ERGOCALCIFEROL 1.25 MG/1
50000 CAPSULE ORAL WEEKLY
Qty: 5 CAPSULE | Refills: 2 | Status: SHIPPED | OUTPATIENT
Start: 2023-03-28

## 2023-04-07 ENCOUNTER — PATIENT ROUNDING (BHMG ONLY) (OUTPATIENT)
Dept: FAMILY MEDICINE CLINIC | Facility: CLINIC | Age: 63
End: 2023-04-07
Payer: MEDICARE

## 2023-04-07 NOTE — PROGRESS NOTES
A [employer] message has been sent to the patient for [place] [facility] [place]

## 2023-09-14 ENCOUNTER — LAB (OUTPATIENT)
Dept: LAB | Facility: HOSPITAL | Age: 63
End: 2023-09-14
Payer: MEDICARE

## 2023-09-14 ENCOUNTER — OFFICE VISIT (OUTPATIENT)
Dept: FAMILY MEDICINE CLINIC | Facility: CLINIC | Age: 63
End: 2023-09-14
Payer: MEDICARE

## 2023-09-14 ENCOUNTER — HOSPITAL ENCOUNTER (OUTPATIENT)
Dept: GENERAL RADIOLOGY | Facility: HOSPITAL | Age: 63
Discharge: HOME | End: 2023-09-14
Payer: MEDICARE

## 2023-09-14 VITALS
WEIGHT: 204 LBS | HEART RATE: 61 BPM | OXYGEN SATURATION: 98 % | SYSTOLIC BLOOD PRESSURE: 138 MMHG | HEIGHT: 70 IN | DIASTOLIC BLOOD PRESSURE: 96 MMHG | RESPIRATION RATE: 20 BRPM | BODY MASS INDEX: 29.2 KG/M2

## 2023-09-14 DIAGNOSIS — M54.50 ACUTE RIGHT-SIDED LOW BACK PAIN WITHOUT SCIATICA: Primary | ICD-10-CM

## 2023-09-14 DIAGNOSIS — F52.32 ANORGASMIA OF MALE: ICD-10-CM

## 2023-09-14 DIAGNOSIS — N39.41 URGE INCONTINENCE OF URINE: ICD-10-CM

## 2023-09-14 DIAGNOSIS — M54.50 ACUTE RIGHT-SIDED LOW BACK PAIN WITHOUT SCIATICA: ICD-10-CM

## 2023-09-14 DIAGNOSIS — M62.838 MUSCLE SPASM: ICD-10-CM

## 2023-09-14 LAB
ALBUMIN SERPL-MCNC: 4.4 G/DL (ref 3.5–5.2)
ALBUMIN/GLOB SERPL: 1.6 G/DL
ALP SERPL-CCNC: 65 U/L (ref 39–117)
ALT SERPL W P-5'-P-CCNC: 24 U/L (ref 1–41)
ANION GAP SERPL CALCULATED.3IONS-SCNC: 11.1 MMOL/L (ref 5–15)
AST SERPL-CCNC: 18 U/L (ref 1–40)
BILIRUB SERPL-MCNC: 1.7 MG/DL (ref 0–1.2)
BUN SERPL-MCNC: 13 MG/DL (ref 8–23)
BUN/CREAT SERPL: 10.1 (ref 7–25)
CALCIUM SPEC-SCNC: 9.1 MG/DL (ref 8.6–10.5)
CHLORIDE SERPL-SCNC: 104 MMOL/L (ref 98–107)
CHOLEST SERPL-MCNC: 181 MG/DL (ref 0–200)
CO2 SERPL-SCNC: 23.9 MMOL/L (ref 22–29)
CREAT SERPL-MCNC: 1.29 MG/DL (ref 0.76–1.27)
EGFRCR SERPLBLD CKD-EPI 2021: 62.7 ML/MIN/1.73
GLOBULIN UR ELPH-MCNC: 2.7 GM/DL
GLUCOSE SERPL-MCNC: 112 MG/DL (ref 65–99)
HDLC SERPL-MCNC: 49 MG/DL (ref 40–60)
LDLC SERPL CALC-MCNC: 116 MG/DL (ref 0–100)
LDLC/HDLC SERPL: 2.35 {RATIO}
POTASSIUM SERPL-SCNC: 4.3 MMOL/L (ref 3.5–5.2)
PROT SERPL-MCNC: 7.1 G/DL (ref 6–8.5)
SODIUM SERPL-SCNC: 139 MMOL/L (ref 136–145)
TRIGL SERPL-MCNC: 84 MG/DL (ref 0–150)
VLDLC SERPL-MCNC: 16 MG/DL (ref 5–40)

## 2023-09-14 PROCEDURE — 1160F RVW MEDS BY RX/DR IN RCRD: CPT | Performed by: STUDENT IN AN ORGANIZED HEALTH CARE EDUCATION/TRAINING PROGRAM

## 2023-09-14 PROCEDURE — 99214 OFFICE O/P EST MOD 30 MIN: CPT | Performed by: STUDENT IN AN ORGANIZED HEALTH CARE EDUCATION/TRAINING PROGRAM

## 2023-09-14 PROCEDURE — 80053 COMPREHEN METABOLIC PANEL: CPT | Performed by: STUDENT IN AN ORGANIZED HEALTH CARE EDUCATION/TRAINING PROGRAM

## 2023-09-14 PROCEDURE — 80061 LIPID PANEL: CPT | Performed by: STUDENT IN AN ORGANIZED HEALTH CARE EDUCATION/TRAINING PROGRAM

## 2023-09-14 PROCEDURE — 72100 X-RAY EXAM L-S SPINE 2/3 VWS: CPT

## 2023-09-14 PROCEDURE — 1159F MED LIST DOCD IN RCRD: CPT | Performed by: STUDENT IN AN ORGANIZED HEALTH CARE EDUCATION/TRAINING PROGRAM

## 2023-09-14 RX ORDER — BACLOFEN 20 MG/1
20 TABLET ORAL 3 TIMES DAILY
Qty: 21 TABLET | Refills: 0 | Status: SHIPPED | OUTPATIENT
Start: 2023-09-14 | End: 2023-09-21

## 2023-09-14 RX ORDER — MELOXICAM 15 MG/1
15 TABLET ORAL DAILY
Qty: 30 TABLET | Refills: 0 | Status: SHIPPED | OUTPATIENT
Start: 2023-09-14 | End: 2023-09-27 | Stop reason: SDUPTHER
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
## 2023-09-21 ENCOUNTER — OFFICE VISIT (OUTPATIENT)
Dept: FAMILY MEDICINE CLINIC | Facility: CLINIC | Age: 63
End: 2023-09-21
Payer: MEDICARE

## 2023-09-21 VITALS
DIASTOLIC BLOOD PRESSURE: 76 MMHG | SYSTOLIC BLOOD PRESSURE: 138 MMHG | HEIGHT: 70 IN | BODY MASS INDEX: 30.21 KG/M2 | RESPIRATION RATE: 18 BRPM | HEART RATE: 70 BPM | OXYGEN SATURATION: 97 % | WEIGHT: 211 LBS

## 2023-09-21 DIAGNOSIS — M54.41 ACUTE RIGHT-SIDED LOW BACK PAIN WITH RIGHT-SIDED SCIATICA: Primary | ICD-10-CM

## 2023-09-21 DIAGNOSIS — N39.41 URGE INCONTINENCE OF URINE: ICD-10-CM

## 2023-09-21 PROCEDURE — 1160F RVW MEDS BY RX/DR IN RCRD: CPT | Performed by: STUDENT IN AN ORGANIZED HEALTH CARE EDUCATION/TRAINING PROGRAM

## 2023-09-21 PROCEDURE — 1159F MED LIST DOCD IN RCRD: CPT | Performed by: STUDENT IN AN ORGANIZED HEALTH CARE EDUCATION/TRAINING PROGRAM

## 2023-09-21 PROCEDURE — 99213 OFFICE O/P EST LOW 20 MIN: CPT | Performed by: STUDENT IN AN ORGANIZED HEALTH CARE EDUCATION/TRAINING PROGRAM

## 2023-09-21 NOTE — PROGRESS NOTES
"    Soto Lucas DO  [facility] PRIMARY CARE  [address]  [place] [id number] [phone]    Subjective      Name Simón Jeter MRN [id number]    1960 AGE/SEX 63 y.o. / male      Chief Complaint Chief Complaint   Patient presents with    Back Pain     Follow up, medication has been making patient light headed         Visit History for  2023    History of Present Illness  Simón Jeter is a 63 y.o. male who presented today for Back Pain (Follow up, medication has been making patient light headed)    The patient has noted improvement. He is still experiencing back pain, but it has reduced in severity. He finds complete resolution of pain when he is urinating, but it returns when he has finished. He is still experiencing pain shooting down his left lower extremity, but this has improved. He initiated baclofen and meloxicam after his most recent visit on 2023. He is experiencing lightheadedness, which he attributes to his new medications and he is taking meloxicam at night to compensate. He talked in his sleep after this, which he has never done before.    He has regular follow-up with urology. He is scheduled for an appointment in 10/2023.      Medications and Allergies   Current Outpatient Medications   Medication Instructions    meloxicam (MOBIC) 7.5 mg, Oral, Daily    prazosin (MINIPRESS) 2 mg, Oral, Nightly    vitamin D (ERGOCALCIFEROL) 50,000 Units, Oral, Weekly     Allergies   Allergen Reactions    Aspirin Swelling    Iodine Unknown - High Severity    Keflex [Cephalexin] Hives      I have reviewed the above medications and allergies     Objective:      Vitals Vitals:    23 1257   BP: 138/76   BP Location: Left arm   Patient Position: Sitting   Cuff Size: Adult   Pulse: 70   Resp: 18   SpO2: 97%   Weight: 95.7 kg (211 lb)   Height: 177.8 cm (70\")     Body mass index is 30.28 kg/m².    Physical Exam  Vitals reviewed.   Constitutional:       General: He is " not in acute distress.     Appearance: He is not ill-appearing.   Pulmonary:      Effort: Pulmonary effort is normal.   Psychiatric:         Mood and Affect: Mood normal.         Behavior: Behavior normal.         Thought Content: Thought content normal.         Judgment: Judgment normal.          Assessment/Plan      Issues Addressed/ Plan  1. Acute right-sided low back pain with right-sided sciatica  - The patient will continue meloxicam and baclofen. A lumbar spine MRI will be ordered.  - MRI Lumbar Spine With & Without Contrast; Future    2. Urge incontinence of urine  - He will follow up with urology in 10/2023.  - MRI Lumbar Spine With & Without Contrast; Future     There are no Patient Instructions on file for this visit.           Follow up  recommended Return in about 1 week (around 9/28/2023) for low back pain.   - Dragon voice recognition software was utilized to complete this chart.  Every reasonable attempt was made to edit and correct the text, however some incorrect words may remain.   Soto Lucas DO    Transcribed from ambient dictation for Soto Lucas DO by Sherita Hernández.  09/21/23   14:05 EDT    Patient or patient representative verbalized consent to the visit recording.  I have personally performed the services described in this document as transcribed by the above individual, and it is both accurate and complete.

## 2023-09-27 ENCOUNTER — OFFICE VISIT (OUTPATIENT)
Dept: FAMILY MEDICINE CLINIC | Facility: CLINIC | Age: 63
End: 2023-09-27
Payer: MEDICARE

## 2023-09-27 VITALS
WEIGHT: 201 LBS | OXYGEN SATURATION: 98 % | HEIGHT: 70 IN | RESPIRATION RATE: 16 BRPM | BODY MASS INDEX: 28.77 KG/M2 | DIASTOLIC BLOOD PRESSURE: 86 MMHG | SYSTOLIC BLOOD PRESSURE: 130 MMHG | HEART RATE: 56 BPM

## 2023-09-27 DIAGNOSIS — M54.50 ACUTE RIGHT-SIDED LOW BACK PAIN WITHOUT SCIATICA: ICD-10-CM

## 2023-09-27 DIAGNOSIS — M62.838 MUSCLE SPASM: ICD-10-CM

## 2023-09-27 DIAGNOSIS — F43.10 PTSD (POST-TRAUMATIC STRESS DISORDER): ICD-10-CM

## 2023-09-27 DIAGNOSIS — E55.9 VITAMIN D DEFICIENCY: ICD-10-CM

## 2023-09-27 PROCEDURE — 99214 OFFICE O/P EST MOD 30 MIN: CPT | Performed by: STUDENT IN AN ORGANIZED HEALTH CARE EDUCATION/TRAINING PROGRAM

## 2023-09-27 PROCEDURE — 1159F MED LIST DOCD IN RCRD: CPT | Performed by: STUDENT IN AN ORGANIZED HEALTH CARE EDUCATION/TRAINING PROGRAM

## 2023-09-27 PROCEDURE — 1160F RVW MEDS BY RX/DR IN RCRD: CPT | Performed by: STUDENT IN AN ORGANIZED HEALTH CARE EDUCATION/TRAINING PROGRAM

## 2023-09-27 RX ORDER — MELOXICAM 7.5 MG/1
7.5 TABLET ORAL DAILY
Qty: 30 TABLET | Refills: 0 | Status: SHIPPED | OUTPATIENT
Start: 2023-09-27

## 2023-09-27 RX ORDER — ERGOCALCIFEROL 1.25 MG/1
50000 CAPSULE ORAL WEEKLY
Qty: 5 CAPSULE | Refills: 2 | Status: SHIPPED | OUTPATIENT
Start: 2023-09-27

## 2023-09-27 RX ORDER — PRAZOSIN HYDROCHLORIDE 2 MG/1
2 CAPSULE ORAL NIGHTLY
Qty: 30 CAPSULE | Refills: 2 | Status: SHIPPED | OUTPATIENT
Start: 2023-09-27

## 2023-09-27 NOTE — PROGRESS NOTES
Soto Lucas DO  [facility] PRIMARY CARE  [facility]  [place] [id number] [phone]    Subjective      Name Simón Jeter MRN [id number]    1960 AGE/SEX 63 y.o. / male      Chief Complaint Chief Complaint   Patient presents with    Sciatica     Follow up on right side pain          Visit History for  2023    History of Present Illness  Simón Jeter is a 63 y.o. male who presented today for Sciatica (Follow up on right side pain )    The patient reports that his back pain is not as painful as it was before. He notes that the meloxicam (Mobic) helped with the pain. He states that he is not having joint problems, aside from the cramps. He describes the pain as a dull, throbbing ache. He adds that the sharp pain is gone. He reports that the back pain worsens if he sits for too long or if he stands for too long. He notes that he has a membership at [employer] where he has been doing some walking and nothing strenuous. Pain reported as 6/10    The patient mentions that his weight went from 204 pounds to 211 pounds to 201 pounds. He drinks plenty of water.    The patient is still taking prazosin (Minipress) for nightmares. He still has 1 week left of the prazosin (Minipress).    The patient needs refill on vitamin D. He ran out of vitamin D supplement last week, the week of 2023. He notes that he loves being outside.        Medications and Allergies   Current Outpatient Medications   Medication Instructions    meloxicam (MOBIC) 7.5 mg, Oral, Daily    prazosin (MINIPRESS) 2 mg, Oral, Nightly    vitamin D (ERGOCALCIFEROL) 50,000 Units, Oral, Weekly     Allergies   Allergen Reactions    Aspirin Swelling    Iodine Unknown - High Severity    Keflex [Cephalexin] Hives      I have reviewed the above medications and allergies     Objective:      Vitals Vitals:    23 0848   BP: 130/86   BP Location: Left arm   Patient Position: Sitting   Cuff Size: Adult   Pulse:  "56   Resp: 16   SpO2: 98%   Weight: 91.2 kg (201 lb)   Height: 177.8 cm (70\")     Body mass index is 28.84 kg/m².    Physical Exam  Vitals reviewed.   Constitutional:       General: He is not in acute distress.     Appearance: He is not ill-appearing.   Cardiovascular:      Rate and Rhythm: Normal rate and regular rhythm.   Pulmonary:      Effort: Pulmonary effort is normal.      Breath sounds: Normal breath sounds.   Neurological:      Mental Status: He is alert.   Psychiatric:         Mood and Affect: Mood normal.         Behavior: Behavior normal.         Thought Content: Thought content normal.         Judgment: Judgment normal.          Assessment/Plan      Issues Addressed/ Plan  1. Muscle spasm  - Continue taking the meloxicam (Mobic) daily for another 3 to 4 weeks at a lower dose of 7.5 mg from 15 mg.  - Advised to avoid any sudden movements.  - The patient is advised to avoid any heavy lifting for another week or so.  - The patient is advised to slowly reintroduce the activities he used to do.  - The patient is advised to do straight-leg stretches.  - meloxicam (MOBIC) 7.5 MG tablet; Take 1 tablet by mouth Daily.  Dispense: 30 tablet; Refill: 0    2. Acute right-sided low back pain without sciatica  - meloxicam (MOBIC) 7.5 MG tablet; Take 1 tablet by mouth Daily.  Dispense: 30 tablet; Refill: 0    3. PTSD (post-traumatic stress disorder)  - Effective at this time.  Needing refill on medication.  Continue current dose and frequency.    - prazosin (MINIPRESS) 2 MG capsule; Take 1 capsule by mouth Every Night.  Dispense: 30 capsule; Refill: 2    4. Vitamin D deficiency  - Still low even with light exposure.  Need to continue with supplementation at this time.   - vitamin D (ERGOCALCIFEROL) 1.25 MG (12685 UT) capsule capsule; Take 1 capsule by mouth 1 (One) Time Per Week.  Dispense: 5 capsule; Refill: 2     There are no Patient Instructions on file for this visit.        Follow up  recommended Return in about 3 " months (around 12/27/2023).   - Dragon voice recognition software was utilized to complete this chart.  Every reasonable attempt was made to edit and correct the text, however some incorrect words may remain.   Soto Lucas DO    Transcribed from ambient dictation for Soto Lucas DO by Bernadette Paula.  09/27/23   11:36 EDT    Patient or patient representative verbalized consent to the visit recording.  I have personally performed the services described in this document as transcribed by the above individual, and it is both accurate and complete.

## 2023-10-17 ENCOUNTER — HOSPITAL ENCOUNTER (OUTPATIENT)
Dept: MRI IMAGING | Facility: HOSPITAL | Age: 63
Discharge: HOME | End: 2023-10-17
Admitting: STUDENT IN AN ORGANIZED HEALTH CARE EDUCATION/TRAINING PROGRAM
Payer: MEDICARE

## 2023-10-17 DIAGNOSIS — M54.41 ACUTE RIGHT-SIDED LOW BACK PAIN WITH RIGHT-SIDED SCIATICA: ICD-10-CM

## 2023-10-17 DIAGNOSIS — N39.41 URGE INCONTINENCE OF URINE: ICD-10-CM

## 2023-10-17 PROCEDURE — 72148 MRI LUMBAR SPINE W/O DYE: CPT

## 2023-11-02 DIAGNOSIS — F43.10 PTSD (POST-TRAUMATIC STRESS DISORDER): ICD-10-CM

## 2023-11-03 RX ORDER — PRAZOSIN HYDROCHLORIDE 2 MG/1
2 CAPSULE ORAL NIGHTLY
Qty: 90 CAPSULE | Refills: 0 | Status: SHIPPED | OUTPATIENT
Start: 2023-11-03

## 2024-01-26 ENCOUNTER — TELEPHONE (OUTPATIENT)
Dept: FAMILY MEDICINE CLINIC | Facility: CLINIC | Age: 64
End: 2024-01-26
Payer: MEDICARE

## 2024-07-05 ENCOUNTER — HOSPITAL ENCOUNTER (OUTPATIENT)
Dept: ULTRASOUND IMAGING | Facility: HOSPITAL | Age: 64
Discharge: HOME | End: 2024-07-05
Payer: MEDICARE

## 2024-07-05 ENCOUNTER — OFFICE VISIT (OUTPATIENT)
Dept: FAMILY MEDICINE CLINIC | Facility: CLINIC | Age: 64
End: 2024-07-05
Payer: MEDICARE

## 2024-07-05 VITALS
DIASTOLIC BLOOD PRESSURE: 68 MMHG | OXYGEN SATURATION: 99 % | SYSTOLIC BLOOD PRESSURE: 106 MMHG | HEART RATE: 68 BPM | WEIGHT: 203.4 LBS | HEIGHT: 70 IN | BODY MASS INDEX: 29.12 KG/M2

## 2024-07-05 DIAGNOSIS — R09.89 HOMANS SIGN PRESENT: ICD-10-CM

## 2024-07-05 DIAGNOSIS — M79.662 PAIN OF LEFT CALF: Primary | ICD-10-CM

## 2024-07-05 PROCEDURE — 1159F MED LIST DOCD IN RCRD: CPT | Performed by: STUDENT IN AN ORGANIZED HEALTH CARE EDUCATION/TRAINING PROGRAM

## 2024-07-05 PROCEDURE — 99213 OFFICE O/P EST LOW 20 MIN: CPT | Performed by: STUDENT IN AN ORGANIZED HEALTH CARE EDUCATION/TRAINING PROGRAM

## 2024-07-05 PROCEDURE — 1126F AMNT PAIN NOTED NONE PRSNT: CPT | Performed by: STUDENT IN AN ORGANIZED HEALTH CARE EDUCATION/TRAINING PROGRAM

## 2024-07-05 PROCEDURE — 1160F RVW MEDS BY RX/DR IN RCRD: CPT | Performed by: STUDENT IN AN ORGANIZED HEALTH CARE EDUCATION/TRAINING PROGRAM

## 2024-07-05 PROCEDURE — 93971 EXTREMITY STUDY: CPT

## 2024-07-09 NOTE — PROGRESS NOTES
"    Soto Lucas DO  [facility] PRIMARY CARE  [address] [place] [address] [address]  [place] [id number] [phone]    Subjective      Name Simón Jeter MRN [id number]    1960 AGE/SEX 63 y.o. / male      Chief Complaint Chief Complaint   Patient presents with    Leg Pain     Left Leg pain swollen         Visit History for  2024    Simón Jeter is a 63 y.o. male who presented today for Leg Pain (Left Leg pain swollen)     History of Present Illness  The patient presents for evaluation of multiple medical concerns.    The patient underwent a biopsy at [facility] approximately 2 weeks ago, which yielded a positive result for prostate cancer. He is scheduled to receive the results on the . His PSA levels have escalated to 2.4 in 2 weeks. An MRI of his back was conducted last year, revealing no abnormalities. He has not undergone a colonoscopy.    The patient reports experiencing leg swelling, which has progressively worsened over the past week. He has abstained from running for the past week due to the pain, even during ambulation. He also reports a sensation of tightness in his toes. He denies any recent injury to the leg. He recalls a shattered kneecap in , which required tendon reattachment and subsequent knee surgery. His daily routine includes running 3 miles.       Medications and Allergies   Current Outpatient Medications   Medication Instructions    meloxicam (MOBIC) 7.5 mg, Oral, Daily    prazosin (MINIPRESS) 2 mg, Oral, Nightly    vitamin D (ERGOCALCIFEROL) 50,000 Units, Oral, Weekly     Allergies   Allergen Reactions    Aspirin Swelling    Iodine Unknown - High Severity    Keflex [Cephalexin] Hives      I have reviewed the above medications and allergies     Objective:      Vitals Vitals:    24 0913   BP: 106/68   BP Location: Left arm   Patient Position: Sitting   Cuff Size: Large Adult   Pulse: 68   SpO2: 99%   Weight: 92.3 kg (203 lb 6.4 oz)   Height: 177.8 cm (70\") "     Body mass index is 29.18 kg/m².    Physical Exam  Vitals reviewed.   Constitutional:       General: He is not in acute distress.     Appearance: He is not ill-appearing.   Cardiovascular:      Rate and Rhythm: Normal rate and regular rhythm.   Pulmonary:      Effort: Pulmonary effort is normal.      Breath sounds: Normal breath sounds.   Musculoskeletal:      Right lower leg: Edema present.      Left lower leg: Edema (Positive dannielle) present.   Psychiatric:         Mood and Affect: Mood normal.         Behavior: Behavior normal.         Thought Content: Thought content normal.         Judgment: Judgment normal.        Physical Exam       Results  Laboratory Studies  PSA was 2.4.     Assessment/Plan   Issues Addressed/ Plan   Diagnosis Plan   1. Pain of left calf  Duplex Venous Lower Extremity - Left CAR    US Venous Doppler Lower Extremity Left (duplex)      2. Homans sign present  Duplex Venous Lower Extremity - Left CAR    US Venous Doppler Lower Extremity Left (duplex)         Assessment & Plan  1. Prostate cancer.    2. Leg swelling.  A venous Doppler of the left leg has been ordered.  Positive homans sign.  Patient is cancer positive, puts higher risk for DVT     BMI is >= 25 and <30. (Overweight) The following options were offered after discussion;: exercise counseling/recommendations and nutrition counseling/recommendations     There are no Patient Instructions on file for this visit.   Follow up  recommended No follow-ups on file.   - Dragon voice recognition software was utilized to complete this chart.  Every reasonable attempt was made to edit and correct the text, however some incorrect words may remain.   Soto Lucas DO    Patient or patient representative verbalized consent for the use of Ambient Listening during the visit with  Soto Lucas DO for chart documentation. 7/9/2024  13:11 EDT

## 2024-11-19 ENCOUNTER — TRANSCRIBE ORDERS (OUTPATIENT)
Dept: ADMINISTRATIVE | Facility: HOSPITAL | Age: 64
End: 2024-11-19
Payer: MEDICARE

## 2024-11-19 DIAGNOSIS — C61 CANCER OF PROSTATE: Primary | ICD-10-CM

## 2024-11-24 DIAGNOSIS — E55.9 VITAMIN D DEFICIENCY: ICD-10-CM

## 2024-11-25 RX ORDER — ERGOCALCIFEROL 1.25 MG/1
50000 CAPSULE, LIQUID FILLED ORAL WEEKLY
Qty: 5 CAPSULE | Refills: 2 | Status: SHIPPED | OUTPATIENT
Start: 2024-11-25

## 2024-12-16 ENCOUNTER — APPOINTMENT (OUTPATIENT)
Dept: GENERAL RADIOLOGY | Facility: CLINIC | Age: 64
End: 2024-12-16
Payer: MEDICARE

## 2024-12-16 PROCEDURE — 71046 X-RAY EXAM CHEST 2 VIEWS: CPT | Performed by: NURSE PRACTITIONER

## 2024-12-19 ENCOUNTER — OFFICE VISIT (OUTPATIENT)
Dept: FAMILY MEDICINE CLINIC | Facility: CLINIC | Age: 64
End: 2024-12-19
Payer: MEDICARE

## 2024-12-19 VITALS
SYSTOLIC BLOOD PRESSURE: 136 MMHG | DIASTOLIC BLOOD PRESSURE: 80 MMHG | BODY MASS INDEX: 30.49 KG/M2 | OXYGEN SATURATION: 98 % | WEIGHT: 213 LBS | RESPIRATION RATE: 18 BRPM | HEART RATE: 70 BPM | HEIGHT: 70 IN

## 2024-12-19 DIAGNOSIS — R73.9 HYPERGLYCEMIA: ICD-10-CM

## 2024-12-19 DIAGNOSIS — R97.20 ELEVATED PSA: ICD-10-CM

## 2024-12-19 DIAGNOSIS — E78.2 MIXED HYPERLIPIDEMIA: ICD-10-CM

## 2024-12-19 DIAGNOSIS — R05.1 ACUTE COUGH: ICD-10-CM

## 2024-12-19 DIAGNOSIS — D53.9 MACROCYTIC ANEMIA: ICD-10-CM

## 2024-12-19 DIAGNOSIS — R25.2 MUSCLE CRAMPS: ICD-10-CM

## 2024-12-19 DIAGNOSIS — E53.8 FOLIC ACID DEFICIENCY: ICD-10-CM

## 2024-12-19 DIAGNOSIS — E55.9 VITAMIN D DEFICIENCY: Primary | ICD-10-CM

## 2024-12-19 PROCEDURE — 1160F RVW MEDS BY RX/DR IN RCRD: CPT | Performed by: STUDENT IN AN ORGANIZED HEALTH CARE EDUCATION/TRAINING PROGRAM

## 2024-12-19 PROCEDURE — 1159F MED LIST DOCD IN RCRD: CPT | Performed by: STUDENT IN AN ORGANIZED HEALTH CARE EDUCATION/TRAINING PROGRAM

## 2024-12-19 PROCEDURE — 1126F AMNT PAIN NOTED NONE PRSNT: CPT | Performed by: STUDENT IN AN ORGANIZED HEALTH CARE EDUCATION/TRAINING PROGRAM

## 2024-12-19 PROCEDURE — 99214 OFFICE O/P EST MOD 30 MIN: CPT | Performed by: STUDENT IN AN ORGANIZED HEALTH CARE EDUCATION/TRAINING PROGRAM

## 2024-12-19 NOTE — PROGRESS NOTES
Soto Lucas,   [facility] PRIMARY CARE  [address]  [place] [id number] [phone]    Subjective      Name Simón Jeter MRN [id number]    1960 AGE/SEX 64 y.o. / male      Chief Complaint Chief Complaint   Patient presents with    Bronchitis     Was seen at  on  and given medication for bronchitis, feeling a little it better     Tremors     Would like referral for neurology for tremors and additional testing for possible ALS           Visit History for  2024    Simón Jeter is a 64 y.o. male who presented today for Bronchitis (Was seen at  on  and given medication for bronchitis, feeling a little it better ) and Tremors (Would like referral for neurology for tremors and additional testing for possible ALS  )       History of Present Illness  He is currently under observation for his elevated PSA levels. An MRI was conducted on 2024, and a biopsy is being considered for either February or the beginning of March, although this has not been finalized. The decision to proceed with the biopsy will be based on the MRI results.    He has been experiencing urinary retention, necessitating an ultrasound examination. Despite the sensation of complete bladder emptying, the ultrasound revealed residual urine. He is under the care of Dr. Sr, urologist. He is currently on Flomax, which has improved his urinary flow.    He reports a history of seasonal coughs during winter, which have been particularly severe this year, lasting up to 2 hours and causing cramps. He sought medical attention at a clinic where he was diagnosed with bronchitis and prescribed codeine, resulting in significant improvement. He also reports fatigue and numbness, which have improved since Monday.    He has not consulted a neurologist since  or  due to issues with obtaining his medical records from . He has been diagnosed with muscle cramps, but there is  "disagreement among his physicians regarding the diagnosis. He has undergone 7 EMG tests on his arm, back, and throat. He reports experiencing cramps in his hands and feet, with one episode lasting 1.5 hours and causing difficulty in straightening up. He has been prescribed baclofen, tizanidine, and hydrocodone, among other medications. He expresses a preference for baclofen due to its effectiveness in relieving his cramps.    He is currently taking prazosin for PTSD, which he reports as effective, but he has run out of the medication. He also reports experiencing nightmares.    He has a known vitamin D deficiency and is currently on supplementation. Despite adherence to his medication regimen and diet, his vitamin D levels remain low, and he has exhausted his refills.    MEDICATIONS  Flomax, baclofen, prazosin, tizanidine, hydrocodone, codeine.       Medications and Allergies   Current Outpatient Medications   Medication Instructions    prazosin (MINIPRESS) 2 mg, Oral, Nightly    tamsulosin (FLOMAX) 0.4 MG capsule 24 hr capsule     vitamin D (ERGOCALCIFEROL) 50,000 Units, Oral, Weekly     Allergies   Allergen Reactions    Aspirin Swelling    Iodine Unknown - High Severity    Keflex [Cephalexin] Hives      I have reviewed the above medications and allergies     Objective:      Vitals Vitals:    12/19/24 1437   BP: 136/80   BP Location: Left arm   Patient Position: Sitting   Cuff Size: Adult   Pulse: 70   Resp: 18   SpO2: 98%   Weight: 96.6 kg (213 lb)   Height: 177.8 cm (70\")     Body mass index is 30.56 kg/m².    Physical Exam  Vitals reviewed.   Constitutional:       General: He is not in acute distress.     Appearance: He is not ill-appearing.   Cardiovascular:      Rate and Rhythm: Normal rate and regular rhythm.   Pulmonary:      Effort: Pulmonary effort is normal.      Breath sounds: Normal breath sounds.   Neurological:      Mental Status: He is alert.   Psychiatric:         Mood and Affect: Mood normal.       "   Behavior: Behavior normal.         Thought Content: Thought content normal.         Judgment: Judgment normal.          Physical Exam       Results  Imaging  Ultrasound showed incomplete bladder emptying. X-ray was negative.     Assessment/Plan   Issues Addressed/ Plan   Diagnosis Plan   1. Vitamin D deficiency  Vitamin D 25 hydroxy      2. Elevated PSA        3. Muscle cramps  TSH    T4, free    CBC w AUTO Differential    Vitamin B12    Folate    Magnesium      4. Macrocytic anemia  CBC w AUTO Differential    Vitamin B12    Folate      5. Hyperglycemia  Hemoglobin A1c    Comprehensive Metabolic Panel      6. Mixed hyperlipidemia  Lipid Panel      7. Acute cough           Assessment & Plan  1. Elevated PSA.  An MRI was conducted on 01/13/2024, and a biopsy is being considered for either February or the beginning of March, although this has not been finalized. The decision to proceed with the biopsy will be based on the MRI results.    2. Urinary retention.  He is currently on Flomax, which has improved his urinary flow.    3. Cough.  He reports a history of seasonal coughs during winter, which have been particularly severe this year, lasting up to 2 hours and causing cramps. He sought medical attention at a clinic where he was diagnosed with bronchitis and prescribed codeine, resulting in significant improvement. He also reports fatigue and numbness, which have improved since Monday.    4. Muscle cramps.  He has not consulted a neurologist since 2019 or 2020 due to issues with obtaining his medical records from . He has been diagnosed with muscle cramps, but there is disagreement among his physicians regarding the diagnosis. He has undergone 7 EMG tests on his arm, back, and throat. He reports experiencing cramps in his hands and feet, with one episode lasting 1.5 hours and causing difficulty in straightening up.    5. PTSD.  He is currently taking prazosin for PTSD, which he reports as effective, but he has run  out of the medication. He also reports experiencing nightmares.    6. Vitamin D deficiency.  He has a known vitamin D deficiency and is currently on supplementation. Despite adherence to his medication regimen and diet, his vitamin D levels remain low, and he has exhausted his refills.           There are no Patient Instructions on file for this visit.   Follow up  recommended Return in about 3 months (around 3/19/2025).   - Dragon voice recognition software was utilized to complete this chart.  Every reasonable attempt was made to edit and correct the text, however some incorrect words may remain.   Soto Lucas DO    Patient or patient representative verbalized consent for the use of Ambient Listening during the visit with  Soto Lucas DO for chart documentation. 1/7/2025  23:15 EST

## 2024-12-19 NOTE — PROGRESS NOTES
Venipuncture Blood Specimen Collection  Venipuncture performed in LEFT ARM by Yane Noguera MA with good hemostasis. Patient tolerated the procedure well without complications.   12/19/24   Yane Noguera MA

## 2024-12-21 LAB
25(OH)D3+25(OH)D2 SERPL-MCNC: 12.3 NG/ML (ref 30–100)
ALBUMIN SERPL-MCNC: 3.9 G/DL (ref 3.9–4.9)
ALP SERPL-CCNC: 79 IU/L (ref 44–121)
ALT SERPL-CCNC: 35 IU/L (ref 0–44)
AST SERPL-CCNC: 20 IU/L (ref 0–40)
BASOPHILS # BLD AUTO: 0 X10E3/UL (ref 0–0.2)
BASOPHILS NFR BLD AUTO: 0 %
BILIRUB SERPL-MCNC: 0.9 MG/DL (ref 0–1.2)
BUN SERPL-MCNC: 14 MG/DL (ref 8–27)
BUN/CREAT SERPL: 10 (ref 10–24)
CALCIUM SERPL-MCNC: 8.8 MG/DL (ref 8.6–10.2)
CHLORIDE SERPL-SCNC: 101 MMOL/L (ref 96–106)
CHOLEST SERPL-MCNC: 144 MG/DL (ref 100–199)
CO2 SERPL-SCNC: 24 MMOL/L (ref 20–29)
CREAT SERPL-MCNC: 1.47 MG/DL (ref 0.76–1.27)
EGFRCR SERPLBLD CKD-EPI 2021: 53 ML/MIN/1.73
EOSINOPHIL # BLD AUTO: 0 X10E3/UL (ref 0–0.4)
EOSINOPHIL NFR BLD AUTO: 0 %
ERYTHROCYTE [DISTWIDTH] IN BLOOD BY AUTOMATED COUNT: 12.5 % (ref 11.6–15.4)
FOLATE SERPL-MCNC: <2 NG/ML
GLOBULIN SER CALC-MCNC: 2.4 G/DL (ref 1.5–4.5)
GLUCOSE SERPL-MCNC: 97 MG/DL (ref 70–99)
HBA1C MFR BLD: 5.5 % (ref 4.8–5.6)
HCT VFR BLD AUTO: 39.9 % (ref 37.5–51)
HDLC SERPL-MCNC: 45 MG/DL
HGB BLD-MCNC: 13.9 G/DL (ref 13–17.7)
IMM GRANULOCYTES # BLD AUTO: 0.2 X10E3/UL (ref 0–0.1)
IMM GRANULOCYTES NFR BLD AUTO: 1 %
LDLC SERPL CALC-MCNC: 74 MG/DL (ref 0–99)
LYMPHOCYTES # BLD AUTO: 2.7 X10E3/UL (ref 0.7–3.1)
LYMPHOCYTES NFR BLD AUTO: 21 %
MAGNESIUM SERPL-MCNC: 2.1 MG/DL (ref 1.6–2.3)
MCH RBC QN AUTO: 31.9 PG (ref 26.6–33)
MCHC RBC AUTO-ENTMCNC: 34.8 G/DL (ref 31.5–35.7)
MCV RBC AUTO: 92 FL (ref 79–97)
MONOCYTES # BLD AUTO: 0.9 X10E3/UL (ref 0.1–0.9)
MONOCYTES NFR BLD AUTO: 7 %
NEUTROPHILS # BLD AUTO: 9.2 X10E3/UL (ref 1.4–7)
NEUTROPHILS NFR BLD AUTO: 71 %
PLATELET # BLD AUTO: 265 X10E3/UL (ref 150–450)
POTASSIUM SERPL-SCNC: 4 MMOL/L (ref 3.5–5.2)
PROT SERPL-MCNC: 6.3 G/DL (ref 6–8.5)
RBC # BLD AUTO: 4.36 X10E6/UL (ref 4.14–5.8)
SODIUM SERPL-SCNC: 140 MMOL/L (ref 134–144)
T4 FREE SERPL-MCNC: 1.42 NG/DL (ref 0.82–1.77)
TRIGL SERPL-MCNC: 143 MG/DL (ref 0–149)
TSH SERPL DL<=0.005 MIU/L-ACNC: 2.66 UIU/ML (ref 0.45–4.5)
VIT B12 SERPL-MCNC: 341 PG/ML (ref 232–1245)
VLDLC SERPL CALC-MCNC: 25 MG/DL (ref 5–40)
WBC # BLD AUTO: 13.1 X10E3/UL (ref 3.4–10.8)

## 2025-01-13 ENCOUNTER — HOSPITAL ENCOUNTER (OUTPATIENT)
Facility: HOSPITAL | Age: 65
Discharge: HOME | End: 2025-01-13
Admitting: UROLOGY
Payer: MEDICARE

## 2025-01-13 DIAGNOSIS — C61 CANCER OF PROSTATE: ICD-10-CM

## 2025-01-13 PROCEDURE — 25510000002 GADOBENATE DIMEGLUMINE 529 MG/ML SOLUTION: Performed by: UROLOGY

## 2025-01-13 PROCEDURE — A9577 INJ MULTIHANCE: HCPCS | Performed by: UROLOGY

## 2025-01-13 PROCEDURE — 72197 MRI PELVIS W/O & W/DYE: CPT

## 2025-01-13 RX ADMIN — GADOBENATE DIMEGLUMINE 20 ML: 529 INJECTION, SOLUTION INTRAVENOUS at 09:23

## 2025-02-03 ENCOUNTER — TRANSCRIBE ORDERS (OUTPATIENT)
Dept: ADMINISTRATIVE | Facility: HOSPITAL | Age: 65
End: 2025-02-03
Payer: MEDICARE

## 2025-02-03 DIAGNOSIS — C61 MALIGNANT NEOPLASM OF PROSTATE: Primary | ICD-10-CM

## 2025-02-18 ENCOUNTER — HOSPITAL ENCOUNTER (OUTPATIENT)
Dept: PET IMAGING | Facility: HOSPITAL | Age: 65
Discharge: HOME | End: 2025-02-18
Payer: MEDICARE

## 2025-02-18 DIAGNOSIS — C61 MALIGNANT NEOPLASM OF PROSTATE: ICD-10-CM

## 2025-02-18 PROCEDURE — 78815 PET IMAGE W/CT SKULL-THIGH: CPT

## 2025-02-18 PROCEDURE — 34310000005 FLOTUFOLASTAT F-18 296-5846 MBQ/ML SOLUTION: Performed by: STUDENT IN AN ORGANIZED HEALTH CARE EDUCATION/TRAINING PROGRAM

## 2025-02-18 PROCEDURE — A9608 FLOTUFOLASTAT F-18 296-5846 MBQ/ML SOLUTION: HCPCS | Performed by: STUDENT IN AN ORGANIZED HEALTH CARE EDUCATION/TRAINING PROGRAM

## 2025-02-18 RX ADMIN — FLOTUFOLASTAT F-18 1 DOSE: 158 INJECTION INTRAVENOUS at 12:02

## 2025-02-25 ENCOUNTER — TRANSCRIBE ORDERS (OUTPATIENT)
Dept: ADMINISTRATIVE | Facility: HOSPITAL | Age: 65
End: 2025-02-25
Payer: MEDICARE

## 2025-02-25 DIAGNOSIS — M89.9 BONE DISEASE: Primary | ICD-10-CM

## 2025-03-19 ENCOUNTER — HOSPITAL ENCOUNTER (OUTPATIENT)
Dept: MRI IMAGING | Facility: HOSPITAL | Age: 65
Discharge: HOME | End: 2025-03-19
Admitting: STUDENT IN AN ORGANIZED HEALTH CARE EDUCATION/TRAINING PROGRAM
Payer: MEDICARE

## 2025-03-19 DIAGNOSIS — M89.9 BONE DISEASE: ICD-10-CM

## 2025-03-19 PROCEDURE — 72197 MRI PELVIS W/O & W/DYE: CPT

## 2025-03-19 PROCEDURE — A9577 INJ MULTIHANCE: HCPCS | Performed by: STUDENT IN AN ORGANIZED HEALTH CARE EDUCATION/TRAINING PROGRAM

## 2025-03-19 PROCEDURE — 25510000002 GADOBENATE DIMEGLUMINE 529 MG/ML SOLUTION: Performed by: STUDENT IN AN ORGANIZED HEALTH CARE EDUCATION/TRAINING PROGRAM

## 2025-03-19 RX ADMIN — GADOBENATE DIMEGLUMINE 19 ML: 529 INJECTION, SOLUTION INTRAVENOUS at 21:15

## 2025-03-20 ENCOUNTER — OFFICE VISIT (OUTPATIENT)
Dept: FAMILY MEDICINE CLINIC | Facility: CLINIC | Age: 65
End: 2025-03-20
Payer: MEDICARE

## 2025-03-20 VITALS
SYSTOLIC BLOOD PRESSURE: 102 MMHG | DIASTOLIC BLOOD PRESSURE: 66 MMHG | HEART RATE: 83 BPM | OXYGEN SATURATION: 98 % | WEIGHT: 203 LBS | BODY MASS INDEX: 29.06 KG/M2 | RESPIRATION RATE: 18 BRPM | HEIGHT: 70 IN

## 2025-03-20 DIAGNOSIS — C61 PROSTATE CANCER: ICD-10-CM

## 2025-03-20 DIAGNOSIS — H10.9 BACTERIAL CONJUNCTIVITIS: Primary | ICD-10-CM

## 2025-03-20 DIAGNOSIS — F43.10 PTSD (POST-TRAUMATIC STRESS DISORDER): ICD-10-CM

## 2025-03-20 DIAGNOSIS — Z12.11 SCREEN FOR COLON CANCER: ICD-10-CM

## 2025-03-20 RX ORDER — POLYMYXIN B SULFATE AND TRIMETHOPRIM 1; 10000 MG/ML; [USP'U]/ML
1 SOLUTION OPHTHALMIC EVERY 4 HOURS
Qty: 10 ML | Refills: 0 | Status: SHIPPED | OUTPATIENT
Start: 2025-03-20 | End: 2025-03-27

## 2025-03-20 RX ORDER — PRAZOSIN HYDROCHLORIDE 2 MG/1
2 CAPSULE ORAL NIGHTLY
Qty: 90 CAPSULE | Refills: 2 | Status: SHIPPED | OUTPATIENT
Start: 2025-03-20

## 2025-03-20 NOTE — PROGRESS NOTES
Soto Lucas DO  [facility] PRIMARY CARE  [address]  [place] [id number] [phone]    Subjective      Name Simón Jeter MRN [id number]    1960 AGE/SEX 64 y.o. / male      Chief Complaint Chief Complaint   Patient presents with    Eye Problem     Having trouble with double vision started x 5-6 days ago. Has drainage coming out of both eyes. If he closes one eye he can see normally. Insides of eyes is painful and burns          Visit History for  2025    Simón Jeter is a 64 y.o. male who presented today for Eye Problem (Having trouble with double vision started x 5-6 days ago. Has drainage coming out of both eyes. If he closes one eye he can see normally. Insides of eyes is painful and burns )       History of Present Illness  He reports experiencing visual disturbances, including the perception of halos in his left field of vision. He also describes a sensation of stinging and burning in his eyes, which are often crusted shut upon waking. He has been managing this by applying a wet washcloth to his eyes each morning. He notes that cold water appears to alleviate the symptoms. He has not had any recent illnesses. He also mentions an incident where his eye discharged fluid during an MRI scan. He has been using a patch over his left eye while driving due to the severity of the symptoms in that eye. He first noticed these symptoms on Monday, initially attributing them to an infection due to the presence of discharge in the corners of both eyes. The condition has since improved, with the exception of persistent discomfort in his left eye.    He has been off prazosin for almost a year and is requesting a refill. He was previously on prazosin for his PTSD. His nightmares have been getting worse with the cancer diagnosis.    He has been diagnosed with prostate cancer and has lesions in his pelvis, hip, and femur. He is currently on one medication for this condition. He  "is scheduled to see a radiation oncologist for further treatment.       Medications and Allergies   Current Outpatient Medications   Medication Instructions    Folic Acid 5 mg, Oral, Daily    prazosin (MINIPRESS) 2 mg, Oral, Nightly    tamsulosin (FLOMAX) 0.4 MG capsule 24 hr capsule     trimethoprim-polymyxin b (POLYTRIM) 03962-9.1 UNIT/ML-% ophthalmic solution 1 drop, Both Eyes, Every 4 Hours    vitamin D (ERGOCALCIFEROL) 50,000 Units, Oral, Weekly     Allergies   Allergen Reactions    Iodine Unknown - High Severity     IV contrast - CT contrast    Shellfish-Derived Products Anaphylaxis    Keflex [Cephalexin] Hives    Aspirin Swelling      I have reviewed the above medications and allergies     Objective:      Vitals Vitals:    03/20/25 1423   BP: 102/66   BP Location: Left arm   Patient Position: Sitting   Cuff Size: Adult   Pulse: 83   Resp: 18   SpO2: 98%   Weight: 92.1 kg (203 lb)   Height: 177.8 cm (70\")     Body mass index is 29.13 kg/m².    Physical Exam  Eyes:      Conjunctiva/sclera:      Right eye: Right conjunctiva is injected. Exudate present.      Left eye: Left conjunctiva is injected. Exudate present.          Physical Exam  Eyes were examined.     Results  Imaging  MRI shows lesions in pelvis, hip, and femur. Gallbladder is full of gallstones. Activity in the right peripheral prostate corresponding with lesion seen on MRI, confirming prostate cancer. Small less dense sclerotic lesion in the left iliac wing with associated mildly increased activity. New small dense sclerotic lesions in the pelvis without associated activity.     Assessment/Plan   Issues Addressed/ Plan   Diagnosis Plan   1. Bacterial conjunctivitis  trimethoprim-polymyxin b (POLYTRIM) 19795-2.1 UNIT/ML-% ophthalmic solution      2. PTSD (post-traumatic stress disorder)  prazosin (MINIPRESS) 2 MG capsule      3. Screen for colon cancer  Ambulatory Referral For Screening Colonoscopy      4. Prostate cancer           Assessment & " Plan  1. Bilateral ocular infection.  The tear duct is obstructed, leading to the accumulation of a film over the eye due to the discharge. This condition is likely bacterial rather than viral. He is advised to apply warm compresses to the affected area 3 to 4 times daily. A prescription for antibiotic eyedrops has been provided, with instructions to administer 1 drop in each eye every 4 hours, totaling 6 times daily. The eyedrops may cause a slight stinging sensation upon application.    2. Prostate cancer.  The patient has confirmed prostate cancer with metastasis to the hip bone. He is currently under the care of a radiation oncologist for further treatment, including potential radiation therapy.    3. Medication management.  A refill for prazosin, which he takes at night for PTSD, has been provided. He has been off the medication for almost a year and requires a new prescription.           There are no Patient Instructions on file for this visit.   Follow up  recommended Return if symptoms worsen or fail to improve.   - Dragon voice recognition software was utilized to complete this chart.  Every reasonable attempt was made to edit and correct the text, however some incorrect words may remain.   Soto Lucas DO    Patient or patient representative verbalized consent for the use of Ambient Listening during the visit with  Soto Lucas DO for chart documentation. 4/7/2025  02:55 EDT

## 2025-04-02 ENCOUNTER — TELEPHONE (OUTPATIENT)
Dept: FAMILY MEDICINE CLINIC | Facility: CLINIC | Age: 65
End: 2025-04-02
Payer: MEDICARE

## 2025-04-02 NOTE — TELEPHONE ENCOUNTER
trimethoprim-polymyxin b (POLYTRIM) 38966-8.1 UNIT/ML-% ophthalmic solution      Patient asked if Dr. Lucas could call in another prescription for eye drops, his eye infection came back (Last seen on 3/20/25).

## 2025-04-03 DIAGNOSIS — H10.9 BACTERIAL CONJUNCTIVITIS: Primary | ICD-10-CM

## 2025-04-03 RX ORDER — POLYMYXIN B SULFATE AND TRIMETHOPRIM 1; 10000 MG/ML; [USP'U]/ML
1 SOLUTION OPHTHALMIC EVERY 4 HOURS
Qty: 10 ML | Refills: 0 | Status: SHIPPED | OUTPATIENT
Start: 2025-04-03 | End: 2025-04-10

## 2025-04-03 RX ORDER — POLYMYXIN B SULFATE AND TRIMETHOPRIM 1; 10000 MG/ML; [USP'U]/ML
1 SOLUTION OPHTHALMIC EVERY 4 HOURS
COMMUNITY
Start: 2025-03-20 | End: 2025-04-03 | Stop reason: SDUPTHER